# Patient Record
Sex: MALE | Race: WHITE | NOT HISPANIC OR LATINO | URBAN - METROPOLITAN AREA
[De-identification: names, ages, dates, MRNs, and addresses within clinical notes are randomized per-mention and may not be internally consistent; named-entity substitution may affect disease eponyms.]

---

## 2018-05-24 ENCOUNTER — OFFICE (AMBULATORY)
Dept: URBAN - METROPOLITAN AREA PATHOLOGY 4 | Facility: PATHOLOGY | Age: 68
End: 2018-05-24
Payer: COMMERCIAL

## 2018-05-24 ENCOUNTER — ON CAMPUS - OUTPATIENT (AMBULATORY)
Dept: URBAN - METROPOLITAN AREA HOSPITAL 2 | Facility: HOSPITAL | Age: 68
End: 2018-05-24
Payer: COMMERCIAL

## 2018-05-24 ENCOUNTER — HOSPITAL ENCOUNTER (OUTPATIENT)
Dept: OTHER | Facility: HOSPITAL | Age: 68
Setting detail: SPECIMEN
Discharge: HOME | End: 2018-05-24
Attending: INTERNAL MEDICINE | Admitting: INTERNAL MEDICINE

## 2018-05-24 VITALS
SYSTOLIC BLOOD PRESSURE: 162 MMHG | TEMPERATURE: 97.9 F | HEART RATE: 76 BPM | RESPIRATION RATE: 13 BRPM | DIASTOLIC BLOOD PRESSURE: 73 MMHG | HEART RATE: 77 BPM | RESPIRATION RATE: 16 BRPM | OXYGEN SATURATION: 98 % | SYSTOLIC BLOOD PRESSURE: 134 MMHG | HEART RATE: 86 BPM | DIASTOLIC BLOOD PRESSURE: 93 MMHG | HEART RATE: 71 BPM | HEIGHT: 71 IN | SYSTOLIC BLOOD PRESSURE: 119 MMHG | SYSTOLIC BLOOD PRESSURE: 184 MMHG | DIASTOLIC BLOOD PRESSURE: 63 MMHG | SYSTOLIC BLOOD PRESSURE: 165 MMHG | SYSTOLIC BLOOD PRESSURE: 112 MMHG | WEIGHT: 167 LBS | HEART RATE: 78 BPM | DIASTOLIC BLOOD PRESSURE: 66 MMHG | DIASTOLIC BLOOD PRESSURE: 75 MMHG | DIASTOLIC BLOOD PRESSURE: 70 MMHG | DIASTOLIC BLOOD PRESSURE: 102 MMHG | DIASTOLIC BLOOD PRESSURE: 97 MMHG | OXYGEN SATURATION: 97 % | HEART RATE: 75 BPM | RESPIRATION RATE: 10 BRPM | SYSTOLIC BLOOD PRESSURE: 120 MMHG | SYSTOLIC BLOOD PRESSURE: 106 MMHG | OXYGEN SATURATION: 99 % | DIASTOLIC BLOOD PRESSURE: 110 MMHG | RESPIRATION RATE: 12 BRPM | SYSTOLIC BLOOD PRESSURE: 129 MMHG | RESPIRATION RATE: 14 BRPM | DIASTOLIC BLOOD PRESSURE: 67 MMHG | HEART RATE: 72 BPM | OXYGEN SATURATION: 100 %

## 2018-05-24 DIAGNOSIS — Z86.010 PERSONAL HISTORY OF COLONIC POLYPS: ICD-10-CM

## 2018-05-24 DIAGNOSIS — D12.2 BENIGN NEOPLASM OF ASCENDING COLON: ICD-10-CM

## 2018-05-24 DIAGNOSIS — K57.30 DIVERTICULOSIS OF LARGE INTESTINE WITHOUT PERFORATION OR ABS: ICD-10-CM

## 2018-05-24 DIAGNOSIS — K64.1 SECOND DEGREE HEMORRHOIDS: ICD-10-CM

## 2018-05-24 LAB
GI HISTOLOGY: A. UNSPECIFIED: (no result)
GI HISTOLOGY: PDF REPORT: (no result)

## 2018-05-24 PROCEDURE — 88305 TISSUE EXAM BY PATHOLOGIST: CPT | Mod: 26 | Performed by: INTERNAL MEDICINE

## 2018-05-24 PROCEDURE — 45380 COLONOSCOPY AND BIOPSY: CPT | Mod: PT | Performed by: INTERNAL MEDICINE

## 2018-05-24 RX ADMIN — PROPOFOL: 10 INJECTION, EMULSION INTRAVENOUS at 11:06

## 2021-07-21 ENCOUNTER — OFFICE VISIT (OUTPATIENT)
Dept: CARDIOLOGY | Facility: CLINIC | Age: 71
End: 2021-07-21

## 2021-07-21 VITALS
OXYGEN SATURATION: 99 % | HEART RATE: 84 BPM | DIASTOLIC BLOOD PRESSURE: 93 MMHG | WEIGHT: 270 LBS | BODY MASS INDEX: 37.8 KG/M2 | HEIGHT: 71 IN | SYSTOLIC BLOOD PRESSURE: 170 MMHG

## 2021-07-21 DIAGNOSIS — R06.02 SHORTNESS OF BREATH: Primary | ICD-10-CM

## 2021-07-21 DIAGNOSIS — E08.9 DIABETES MELLITUS DUE TO UNDERLYING CONDITION WITHOUT COMPLICATION, WITHOUT LONG-TERM CURRENT USE OF INSULIN (HCC): ICD-10-CM

## 2021-07-21 DIAGNOSIS — I10 ESSENTIAL HYPERTENSION: ICD-10-CM

## 2021-07-21 DIAGNOSIS — E78.5 DYSLIPIDEMIA: ICD-10-CM

## 2021-07-21 PROCEDURE — 93000 ELECTROCARDIOGRAM COMPLETE: CPT | Performed by: INTERNAL MEDICINE

## 2021-07-21 PROCEDURE — 99204 OFFICE O/P NEW MOD 45 MIN: CPT | Performed by: INTERNAL MEDICINE

## 2021-07-21 RX ORDER — ATORVASTATIN CALCIUM 10 MG/1
10 TABLET, FILM COATED ORAL DAILY
COMMUNITY
Start: 2021-07-13

## 2021-07-21 RX ORDER — DOCUSATE SODIUM 100 MG/1
100 CAPSULE, LIQUID FILLED ORAL DAILY
COMMUNITY
Start: 2021-05-03

## 2021-07-21 RX ORDER — ACETAMINOPHEN 500 MG
500 TABLET ORAL EVERY 6 HOURS PRN
COMMUNITY

## 2021-07-21 RX ORDER — ASPIRIN 81 MG/1
81 TABLET ORAL DAILY
COMMUNITY

## 2021-07-21 RX ORDER — DICLOFENAC SODIUM 75 MG/1
75 TABLET, DELAYED RELEASE ORAL 2 TIMES DAILY
COMMUNITY

## 2021-07-21 RX ORDER — TRAMADOL HYDROCHLORIDE 50 MG/1
50 TABLET ORAL 2 TIMES DAILY
COMMUNITY
Start: 2021-05-23

## 2021-07-21 RX ORDER — HYDROCHLOROTHIAZIDE 25 MG/1
25 TABLET ORAL DAILY
COMMUNITY
Start: 2021-05-23 | End: 2021-08-09 | Stop reason: ALTCHOICE

## 2021-07-21 RX ORDER — TRIAMTERENE AND HYDROCHLOROTHIAZIDE 37.5; 25 MG/1; MG/1
1 TABLET ORAL DAILY
COMMUNITY

## 2021-07-21 RX ORDER — TOPIRAMATE 25 MG/1
25 TABLET ORAL 2 TIMES DAILY
COMMUNITY
Start: 2021-06-16 | End: 2021-08-09 | Stop reason: ALTCHOICE

## 2021-07-21 NOTE — PROGRESS NOTES
Encounter Date:07/21/2021      Patient ID: Juan Alberto Thorpe is a 70 y.o. male.    Chief Complaint:  Increased shortness of breath  Hypertension  Dyslipidemia  Diabetes    History of Present Illness  The patient is a pleasant 70-year-old white male is here for evaluation of progressively worsening shortness of breath over last several months.  According to his wife patient cannot walk distance without getting short of breath.  Shortness of breath gets better with resting.  Denies having any chest discomfort heaviness or tightness in the chest.  No other associated aggravating or elevating factors.  No symptoms of orthopnea or PND.  Patient did not have any cardiac problems in the past.  Patient is a non-smoker.  Patient has multiple coronary risk factors as documented below.    Patient is not having any chest discomfort palpitations, dizziness or syncope.  Denies having any headache ,abdominal pain ,nausea, vomiting , diarrhea constipation, loss of weight or loss of appetite.  Denies having any excessive bruising ,hematuria or blood in the stool.    Review of all systems negative except as indicated.    Reviewed ROS.    Assessment and Plan     ]]]]]]]]]]]]]]]]]]]  Impression  ==========  -Shortness of breath with exertion-angina equivalent.  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  Patient has multiple underlying coronary risk factors.  Lexiscan Cardiolite test.  Echocardiogram  Follow-up in the office on the same day.  Further plan will depend on patient's progress.  ]]]]]]]]]]]]]]]           Diagnosis Plan   1. Shortness  of breath     2. Essential hypertension     3. Dyslipidemia     4. Diabetes mellitus due to underlying condition without complication, without long-term current use of insulin (CMS/[facility])     LAB RESULTS (LAST 7 DAYS)    CBC        BMP        CMP         BNP        TROPONIN        CoAg        Creatinine Clearance  CrCl cannot be calculated (No successful lab value found.).    ABG        Radiology  No radiology results for the last day                The following portions of the patient's history were reviewed and updated as appropriate: allergies, current medications, past family history, past medical history, past social history, past surgical history and problem list.    Review of Systems   Constitutional: Negative for malaise/fatigue.   Cardiovascular: Positive for leg swelling and palpitations (skipped beats). Negative for chest pain and syncope.   Respiratory: Positive for shortness of breath.    Skin: Negative for rash.   Gastrointestinal: Negative for nausea and vomiting.   Neurological: Positive for tremors. Negative for dizziness, light-headedness and numbness.         Current Outpatient Medications:   •  acetaminophen (TYLENOL) 500 MG tablet, Take 500 mg by mouth Every 6 (Six) Hours As Needed for Mild Pain ., Disp: , Rfl:   •  aspirin 81 MG EC tablet, Take 81 mg by mouth Daily., Disp: , Rfl:   •  atorvastatin (LIPITOR) 10 MG tablet, Take 10 mg by mouth Daily., Disp: , Rfl:   •  diclofenac (VOLTAREN) 75 MG EC tablet, Take 75 mg by mouth 2 (Two) Times a Day., Disp: , Rfl:   •  docusate sodium (COLACE) 100 MG capsule, Take 100 mg by mouth Daily., Disp: , Rfl:   •  topiramate (TOPAMAX) 25 MG tablet, Take 25 mg by mouth 2 (Two) Times a Day., Disp: , Rfl:   •  traMADol (ULTRAM) 50 MG tablet, Take 50 mg by mouth 2 (Two) Times a Day., Disp: , Rfl:   •  triamterene-hydrochlorothiazide (MAXZIDE-25) 37.5-25 MG per tablet, Take 1 tablet by mouth Daily., Disp: , Rfl:   •  hydroCHLOROthiazide (HYDRODIURIL) 25 MG tablet,  "Take 25 mg by mouth Daily., Disp: , Rfl:   •  metFORMIN (GLUCOPHAGE) 1000 MG tablet, Take 1,000 mg by mouth 2 (Two) Times a Day., Disp: , Rfl:     Allergies   Allergen Reactions   • Sulfa Antibiotics Other (See Comments)     Unknown        Family History   Problem Relation Age of Onset   • Anuerysm Mother    • Lung cancer Father        Past Surgical History:   Procedure Laterality Date   • CARDIAC CATHETERIZATION     • CYSTOSCOPY BLADDER STONE LITHOTRIPSY     • REPLACEMENT TOTAL KNEE         Past Medical History:   Diagnosis Date   • Arthritis    • Diabetes mellitus (CMS/HCC)    • Hyperlipidemia    • Hypertension        Family History   Problem Relation Age of Onset   • Anuerysm Mother    • Lung cancer Father        Social History     Socioeconomic History   • Marital status:      Spouse name: Not on file   • Number of children: Not on file   • Years of education: Not on file   • Highest education level: Not on file   Tobacco Use   • Smoking status: Never Smoker   • Smokeless tobacco: Former User   Vaping Use   • Vaping Use: Never used   Substance and Sexual Activity   • Alcohol use: Not Currently     Comment: Beer. 1-2 times per year    • Drug use: Never   • Sexual activity: Defer           ECG 12 Lead    Date/Time: 7/21/2021 3:52 PM  Performed by: Kiara Prakash MD  Authorized by: Kiara Prakash MD   Comparison: compared with previous ECG   Comparison to previous ECG: Normal sinus rhythm nonspecific ST-T wave changes 82/min normal axis normal intervals no ectopy small inferolateral Q waves normal axis.                Objective:       Physical Exam    /93   Pulse 84   Ht 180.3 cm (71\")   Wt 122 kg (270 lb)   SpO2 99%   BMI 37.66 kg/m²   The patient is alert, oriented and in no distress.    Vital signs as noted above.  Exogenous obesity (BMI 38)    Head and neck revealed no carotid bruits or jugular venous distension.  No thyromegaly or lymphadenopathy is present.    Lungs clear.  No " wheezing.  Breath sounds are normal bilaterally.    Heart normal first and second heart sounds.  No murmur..  No pericardial rub is present.  No gallop is present.    Abdomen soft and nontender.  No organomegaly is present.    Extremities revealed good peripheral pulses.  1+ edema    Skin warm and dry.    Musculoskeletal system is grossly normal.    CNS grossly normal.

## 2021-08-09 ENCOUNTER — HOSPITAL ENCOUNTER (OUTPATIENT)
Dept: CARDIOLOGY | Facility: HOSPITAL | Age: 71
Discharge: HOME | End: 2021-08-09

## 2021-08-09 ENCOUNTER — OFFICE VISIT (OUTPATIENT)
Dept: CARDIOLOGY | Facility: CLINIC | Age: 71
End: 2021-08-09

## 2021-08-09 VITALS
HEIGHT: 71 IN | DIASTOLIC BLOOD PRESSURE: 92 MMHG | BODY MASS INDEX: 37.8 KG/M2 | WEIGHT: 270 LBS | SYSTOLIC BLOOD PRESSURE: 152 MMHG

## 2021-08-09 VITALS
HEIGHT: 71 IN | DIASTOLIC BLOOD PRESSURE: 86 MMHG | HEART RATE: 81 BPM | BODY MASS INDEX: 37.8 KG/M2 | OXYGEN SATURATION: 96 % | WEIGHT: 270 LBS | SYSTOLIC BLOOD PRESSURE: 142 MMHG

## 2021-08-09 DIAGNOSIS — E78.5 DYSLIPIDEMIA: ICD-10-CM

## 2021-08-09 DIAGNOSIS — I10 ESSENTIAL HYPERTENSION: ICD-10-CM

## 2021-08-09 DIAGNOSIS — E08.9 DIABETES MELLITUS DUE TO UNDERLYING CONDITION WITHOUT COMPLICATION, WITHOUT LONG-TERM CURRENT USE OF INSULIN (HCC): ICD-10-CM

## 2021-08-09 DIAGNOSIS — R06.02 SHORTNESS OF BREATH: ICD-10-CM

## 2021-08-09 DIAGNOSIS — R06.02 SHORTNESS OF BREATH: Primary | ICD-10-CM

## 2021-08-09 LAB
BH CV ECHO MEAS - ACS: 2 CM
BH CV ECHO MEAS - AO MAX PG (FULL): 0.2 MMHG
BH CV ECHO MEAS - AO MAX PG: 4.9 MMHG
BH CV ECHO MEAS - AO MEAN PG (FULL): 0.37 MMHG
BH CV ECHO MEAS - AO MEAN PG: 3 MMHG
BH CV ECHO MEAS - AO ROOT AREA (BSA CORRECTED): 1.5
BH CV ECHO MEAS - AO ROOT AREA: 10.6 CM^2
BH CV ECHO MEAS - AO ROOT DIAM: 3.7 CM
BH CV ECHO MEAS - AO V2 MAX: 111.1 CM/SEC
BH CV ECHO MEAS - AO V2 MEAN: 83.7 CM/SEC
BH CV ECHO MEAS - AO V2 VTI: 20.6 CM
BH CV ECHO MEAS - ASC AORTA: 3 CM
BH CV ECHO MEAS - AVA(I,A): 3.8 CM^2
BH CV ECHO MEAS - AVA(I,D): 3.8 CM^2
BH CV ECHO MEAS - AVA(V,A): 3.3 CM^2
BH CV ECHO MEAS - AVA(V,D): 3.3 CM^2
BH CV ECHO MEAS - BSA(HAYCOCK): 2.5 M^2
BH CV ECHO MEAS - BSA: 2.4 M^2
BH CV ECHO MEAS - BZI_BMI: 37.7 KILOGRAMS/M^2
BH CV ECHO MEAS - BZI_METRIC_HEIGHT: 180.3 CM
BH CV ECHO MEAS - BZI_METRIC_WEIGHT: 122.5 KG
BH CV ECHO MEAS - EDV(CUBED): 88.5 ML
BH CV ECHO MEAS - EDV(MOD-SP4): 49.7 ML
BH CV ECHO MEAS - EDV(TEICH): 90.3 ML
BH CV ECHO MEAS - EF(CUBED): 90.5 %
BH CV ECHO MEAS - EF(MOD-SP4): 54.9 %
BH CV ECHO MEAS - EF(TEICH): 85.3 %
BH CV ECHO MEAS - ESV(CUBED): 8.4 ML
BH CV ECHO MEAS - ESV(MOD-SP4): 22.4 ML
BH CV ECHO MEAS - ESV(TEICH): 13.3 ML
BH CV ECHO MEAS - FS: 54.4 %
BH CV ECHO MEAS - IVS/LVPW: 1.2
BH CV ECHO MEAS - IVSD: 1.4 CM
BH CV ECHO MEAS - LA DIMENSION: 4.7 CM
BH CV ECHO MEAS - LA/AO: 1.3
BH CV ECHO MEAS - LV DIASTOLIC VOL/BSA (35-75): 20.7 ML/M^2
BH CV ECHO MEAS - LV MASS(C)D: 216.4 GRAMS
BH CV ECHO MEAS - LV MASS(C)DI: 90.3 GRAMS/M^2
BH CV ECHO MEAS - LV MAX PG: 4.7 MMHG
BH CV ECHO MEAS - LV MEAN PG: 2.6 MMHG
BH CV ECHO MEAS - LV SYSTOLIC VOL/BSA (12-30): 9.4 ML/M^2
BH CV ECHO MEAS - LV V1 MAX: 108.9 CM/SEC
BH CV ECHO MEAS - LV V1 MEAN: 77 CM/SEC
BH CV ECHO MEAS - LV V1 VTI: 23.7 CM
BH CV ECHO MEAS - LVIDD: 4.5 CM
BH CV ECHO MEAS - LVIDS: 2 CM
BH CV ECHO MEAS - LVOT AREA: 3.3 CM^2
BH CV ECHO MEAS - LVOT DIAM: 2.1 CM
BH CV ECHO MEAS - LVPWD: 1.1 CM
BH CV ECHO MEAS - MV A MAX VEL: 88.2 CM/SEC
BH CV ECHO MEAS - MV DEC SLOPE: 251.7 CM/SEC^2
BH CV ECHO MEAS - MV DEC TIME: 0.2 SEC
BH CV ECHO MEAS - MV E MAX VEL: 50.1 CM/SEC
BH CV ECHO MEAS - MV E/A: 0.57
BH CV ECHO MEAS - MV MAX PG: 4 MMHG
BH CV ECHO MEAS - MV MEAN PG: 1.4 MMHG
BH CV ECHO MEAS - MV V2 MAX: 100.2 CM/SEC
BH CV ECHO MEAS - MV V2 MEAN: 56.6 CM/SEC
BH CV ECHO MEAS - MV V2 VTI: 14.7 CM
BH CV ECHO MEAS - MVA(VTI): 5.4 CM^2
BH CV ECHO MEAS - PA ACC TIME: 0.1 SEC
BH CV ECHO MEAS - PA PR(ACCEL): 33.8 MMHG
BH CV ECHO MEAS - RV MAX PG: 2.2 MMHG
BH CV ECHO MEAS - RV MEAN PG: 1.2 MMHG
BH CV ECHO MEAS - RV V1 MAX: 74.8 CM/SEC
BH CV ECHO MEAS - RV V1 MEAN: 53 CM/SEC
BH CV ECHO MEAS - RV V1 VTI: 13.6 CM
BH CV ECHO MEAS - RVDD: 3 CM
BH CV ECHO MEAS - SI(AO): 91.4 ML/M^2
BH CV ECHO MEAS - SI(CUBED): 33.4 ML/M^2
BH CV ECHO MEAS - SI(LVOT): 33 ML/M^2
BH CV ECHO MEAS - SI(MOD-SP4): 11.4 ML/M^2
BH CV ECHO MEAS - SI(TEICH): 32.2 ML/M^2
BH CV ECHO MEAS - SV(AO): 218.9 ML
BH CV ECHO MEAS - SV(CUBED): 80.1 ML
BH CV ECHO MEAS - SV(LVOT): 79 ML
BH CV ECHO MEAS - SV(MOD-SP4): 27.3 ML
BH CV ECHO MEAS - SV(TEICH): 77.1 ML
BH CV REST NUCLEAR ISOTOPE DOSE: 10.3 MCI
BH CV STRESS COMMENTS STAGE 1: NORMAL
BH CV STRESS DOSE REGADENOSON STAGE 1: 0.4
BH CV STRESS DURATION MIN STAGE 1: 0
BH CV STRESS DURATION SEC STAGE 1: 10
BH CV STRESS NUCLEAR ISOTOPE DOSE: 31.4 MCI
BH CV STRESS PROTOCOL 1: NORMAL
BH CV STRESS RECOVERY BP: NORMAL MMHG
BH CV STRESS RECOVERY HR: 105 BPM
BH CV STRESS STAGE 1: 1
LV EF 2D ECHO EST: 60 %
MAXIMAL PREDICTED HEART RATE: 149 BPM
MAXIMAL PREDICTED HEART RATE: 149 BPM
STRESS BASELINE BP: NORMAL MMHG
STRESS BASELINE HR: 82 BPM
STRESS TARGET HR: 127 BPM
STRESS TARGET HR: 127 BPM

## 2021-08-09 PROCEDURE — 93306 TTE W/DOPPLER COMPLETE: CPT | Performed by: INTERNAL MEDICINE

## 2021-08-09 PROCEDURE — 93306 TTE W/DOPPLER COMPLETE: CPT

## 2021-08-09 PROCEDURE — 93017 CV STRESS TEST TRACING ONLY: CPT

## 2021-08-09 PROCEDURE — A9500 TC99M SESTAMIBI: HCPCS | Performed by: INTERNAL MEDICINE

## 2021-08-09 PROCEDURE — 99214 OFFICE O/P EST MOD 30 MIN: CPT | Performed by: INTERNAL MEDICINE

## 2021-08-09 PROCEDURE — 93018 CV STRESS TEST I&R ONLY: CPT | Performed by: INTERNAL MEDICINE

## 2021-08-09 PROCEDURE — 93016 CV STRESS TEST SUPVJ ONLY: CPT | Performed by: INTERNAL MEDICINE

## 2021-08-09 PROCEDURE — 78452 HT MUSCLE IMAGE SPECT MULT: CPT

## 2021-08-09 PROCEDURE — 78452 HT MUSCLE IMAGE SPECT MULT: CPT | Performed by: INTERNAL MEDICINE

## 2021-08-09 PROCEDURE — 0 TECHNETIUM SESTAMIBI: Performed by: INTERNAL MEDICINE

## 2021-08-09 PROCEDURE — 25010000002 REGADENOSON 0.4 MG/5ML SOLUTION: Performed by: INTERNAL MEDICINE

## 2021-08-09 RX ORDER — SITAGLIPTIN AND METFORMIN HYDROCHLORIDE 1000; 50 MG/1; MG/1
1 TABLET, FILM COATED ORAL 2 TIMES DAILY
COMMUNITY
Start: 2021-07-21

## 2021-08-09 RX ADMIN — REGADENOSON 0.4 MG: 0.08 INJECTION, SOLUTION INTRAVENOUS at 13:44

## 2021-08-09 RX ADMIN — TECHNETIUM TC 99M SESTAMIBI 1 DOSE: 1 INJECTION INTRAVENOUS at 13:44

## 2021-08-09 RX ADMIN — TECHNETIUM TC 99M SESTAMIBI 1 DOSE: 1 INJECTION INTRAVENOUS at 12:13

## 2021-08-09 NOTE — PROGRESS NOTES
Encounter Date:08/09/2021  Last seen 7/21/2021      Patient ID: Juan Alberto Thorpe is a 70 y.o. male.    Chief Complaint:  Increased shortness of breath  Hypertension  Dyslipidemia  Diabetes     History of Present Illness  Patient recently was seen with following history.  Reviewed and updated.    The patient is a pleasant 70-year-old white male is here for evaluation of progressively worsening shortness of breath over last several months.  According to his wife patient cannot walk distance without getting short of breath.  Shortness of breath gets better with resting.  Denies having any chest discomfort heaviness or tightness in the chest.  No other associated aggravating or elevating factors.  No symptoms of orthopnea or PND.  Patient did not have any cardiac problems in the past.  Patient is a non-smoker.  Patient has multiple coronary risk factors as documented below.     Patient is not having any chest discomfort palpitations, dizziness or syncope.  Denies having any headache ,abdominal pain ,nausea, vomiting , diarrhea constipation, loss of weight or loss of appetite.  Denies having any excessive bruising ,hematuria or blood in the stool.     Review of all systems negative except as indicated.     Reviewed ROS.     Assessment and Plan      ]]]]]]]]]]]]]]]]]]]  Impression  ==========  -Shortness of breath with exertion-angina equivalent.  Lower extremity edema    Echocardiogram-normal except for mild left atrial enlargement-8/9/2021.  Lexiscan Cardiolite test-8/9/2021-normal    -Dyslipidemia hypertension diabetes     -Status post right total knee replacement     -Exogenous obesity (BMI 38)     -Family history of lung cancer     -Non-smoker     -Allergic to sulfa  =========  Plan  ==========  Recent EKG showed sinus rhythm nonspecific ST-T wave changes  Patient has significantly and progressively worsening shortness of breath with exertion and relieved by rest suggestive of possible angina equivalent.  Patient has  multiple underlying coronary risk factors.  Lexiscan Cardiolite test.-Normal as above  Echocardiogram-as above  Follow-up in the office 6 weeks.  No need for cardiac catheterization at this time.  Consider cardiac catheterization when patient returns to the office if he has continued symptoms.  Patient and patient's wife were educated regarding the results of the testing and discussed the options.  Further plan will depend on patient's progress.  ]]]]]]]]]]]]]]]               Diagnosis Plan   1. Shortness of breath     2. Essential hypertension     3. Dyslipidemia     4. Diabetes mellitus due to underlying condition without complication, without long-term current use of insulin (CMS/[facility])     LAB RESULTS (LAST 7 DAYS)    CBC        BMP        CMP         BNP        TROPONIN        CoAg        Creatinine Clearance  CrCl cannot be calculated (No successful lab value found.).    ABG        Radiology  No radiology results for the last day                The following portions of the patient's history were reviewed and updated as appropriate: allergies, current medications, past family history, past medical history, past social history, past surgical history and problem list.    Review of Systems   Constitutional: Negative for chills, fever and malaise/fatigue.   Cardiovascular: Positive for dyspnea on exertion and leg swelling. Negative for chest pain, palpitations and syncope.   Respiratory: Positive for shortness of breath.    Skin: Negative for rash.   Neurological: Negative for dizziness, light-headedness and numbness.         Current Outpatient Medications:   •  acetaminophen (TYLENOL) 500 MG tablet, Take 500 mg by mouth Every 6 (Six) Hours As Needed for Mild Pain ., Disp: , Rfl:   •  aspirin 81 MG EC tablet, Take 81 mg by mouth Daily., Disp: , Rfl:   •  atorvastatin (LIPITOR) 10 MG tablet, Take 10 mg by mouth Daily., Disp: , Rfl:   •  diclofenac (VOLTAREN) 75 MG EC tablet, Take 75 mg by mouth 2 (Two) Times a Day.,  "Disp: , Rfl:   •  docusate sodium (COLACE) 100 MG capsule, Take 100 mg by mouth Daily., Disp: , Rfl:   •  Janumet  MG per tablet, Take 1 tablet by mouth 2 (Two) Times a Day., Disp: , Rfl:   •  traMADol (ULTRAM) 50 MG tablet, Take 50 mg by mouth 2 (Two) Times a Day., Disp: , Rfl:   •  triamterene-hydrochlorothiazide (MAXZIDE-25) 37.5-25 MG per tablet, Take 1 tablet by mouth Daily., Disp: , Rfl:   No current facility-administered medications for this visit.    Allergies   Allergen Reactions   • Sulfa Antibiotics Other (See Comments)     Unknown        Family History   Problem Relation Age of Onset   • Anuerysm Mother    • Lung cancer Father        Past Surgical History:   Procedure Laterality Date   • CARDIAC CATHETERIZATION     • CYSTOSCOPY BLADDER STONE LITHOTRIPSY     • REPLACEMENT TOTAL KNEE         Past Medical History:   Diagnosis Date   • Arthritis    • Diabetes mellitus (CMS/HCC)    • Hyperlipidemia    • Hypertension        Family History   Problem Relation Age of Onset   • Anuerysm Mother    • Lung cancer Father        Social History     Socioeconomic History   • Marital status:      Spouse name: Not on file   • Number of children: Not on file   • Years of education: Not on file   • Highest education level: Not on file   Tobacco Use   • Smoking status: Never Smoker   • Smokeless tobacco: Former User   Vaping Use   • Vaping Use: Never used   Substance and Sexual Activity   • Alcohol use: Not Currently     Comment: Beer. 1-2 times per year    • Drug use: Never   • Sexual activity: Defer         Procedures      Objective:       Physical Exam    /86 (BP Location: Left arm, Patient Position: Sitting, Cuff Size: Large Adult)   Pulse 81   Ht 180.3 cm (71\")   Wt 122 kg (270 lb)   SpO2 96%   BMI 37.66 kg/m²   The patient is alert, oriented and in no distress.    Vital signs as noted above.  Exogenous obesity (BMI 38)    Head and neck revealed no carotid bruits or jugular venous distension.  No " thyromegaly or lymphadenopathy is present.    Lungs clear.  No wheezing.  Breath sounds are normal bilaterally.    Heart normal first and second heart sounds.  No murmur..  No pericardial rub is present.  No gallop is present.    Abdomen soft and nontender.  No organomegaly is present.    Extremities revealed good peripheral pulses without any pedal edema.    Skin warm and dry.    Musculoskeletal system is grossly normal.    CNS grossly normal.

## 2021-09-27 ENCOUNTER — OFFICE VISIT (OUTPATIENT)
Dept: CARDIOLOGY | Facility: CLINIC | Age: 71
End: 2021-09-27

## 2021-09-27 VITALS
DIASTOLIC BLOOD PRESSURE: 86 MMHG | SYSTOLIC BLOOD PRESSURE: 151 MMHG | HEART RATE: 84 BPM | HEIGHT: 71 IN | OXYGEN SATURATION: 99 % | BODY MASS INDEX: 36.82 KG/M2 | WEIGHT: 263 LBS

## 2021-09-27 DIAGNOSIS — E08.9 DIABETES MELLITUS DUE TO UNDERLYING CONDITION WITHOUT COMPLICATION, WITHOUT LONG-TERM CURRENT USE OF INSULIN (HCC): ICD-10-CM

## 2021-09-27 DIAGNOSIS — I10 ESSENTIAL HYPERTENSION: ICD-10-CM

## 2021-09-27 DIAGNOSIS — E78.5 DYSLIPIDEMIA: ICD-10-CM

## 2021-09-27 DIAGNOSIS — R06.02 SHORTNESS OF BREATH: Primary | ICD-10-CM

## 2021-09-27 PROCEDURE — 99214 OFFICE O/P EST MOD 30 MIN: CPT | Performed by: INTERNAL MEDICINE

## 2021-09-27 NOTE — PROGRESS NOTES
Encounter Date:09/27/2021  Last seen 1/18/2021      Patient ID: Juan Alberto Thorpe is a 70 y.o. male.    Chief Complaint:  Increased shortness of breath  Hypertension  Dyslipidemia  Diabetes     History of Present Illness  Patient recently was seen with following history.  Reviewed and updated.  Patient is doing better     The patient is a pleasant 70-year-old white male is here for evaluation of progressively worsening shortness of breath over last several months.  According to his wife patient cannot walk distance without getting short of breath.  Shortness of breath gets better with resting.  Denies having any chest discomfort heaviness or tightness in the chest.  No other associated aggravating or elevating factors.  No symptoms of orthopnea or PND.  Patient did not have any cardiac problems in the past.  Patient is a non-smoker.  Patient has multiple coronary risk factors as documented below.     Patient is not having any chest discomfort palpitations, dizziness or syncope.  Denies having any headache ,abdominal pain ,nausea, vomiting , diarrhea constipation, loss of weight or loss of appetite.  Denies having any excessive bruising ,hematuria or blood in the stool.     Review of all systems negative except as indicated.     Reviewed ROS.     Assessment and Plan      ]]]]]]]]]]]]]]]]]]]  Impression  ==========  -Shortness of breath with exertion-angina equivalent.-Improved    Lower extremity edema-improved     Echocardiogram-normal except for mild left atrial enlargement-8/9/2021.  Lexiscan Cardiolite test-8/9/2021-normal     -Dyslipidemia hypertension diabetes     -Status post right total knee replacement     -Exogenous obesity (BMI 38)     -Family history of lung cancer     -Non-smoker     -Allergic to sulfa  =========  Plan  ==========  Recent EKG showed sinus rhythm nonspecific ST-T wave changes  Patient has significantly and progressively worsening shortness of breath with exertion and relieved by rest  suggestive of possible angina equivalent.  Patient has multiple underlying coronary risk factors.  Lexiscan Cardiolite test.-Normal as above  Echocardiogram-as above    Patient has improved shortness of breath and lower extremity edema.  No need for cardiac catheterization at this time.  Follow-up in the office in 6 months.  Consider cardiac catheterization when patient returns to the office if he has continued symptoms.    Further plan will depend on patient's progress.  ]]]]]]]]]]]]]]]                   Diagnosis Plan   1. Shortness of breath     2. Essential hypertension     3. Dyslipidemia     4. Diabetes mellitus due to underlying condition without complication, without long-term current use of insulin (HCC)     LAB RESULTS (LAST 7 DAYS)    CBC        BMP        CMP         BNP        TROPONIN        CoAg        Creatinine Clearance  CrCl cannot be calculated (No successful lab value found.).    ABG        Radiology  No radiology results for the last day                The following portions of the patient's history were reviewed and updated as appropriate: allergies, current medications, past family history, past medical history, past social history, past surgical history and problem list.    Review of Systems   Constitutional: Negative for malaise/fatigue.   Cardiovascular: Negative for chest pain, leg swelling, palpitations and syncope.   Respiratory: Negative for shortness of breath.    Skin: Negative for rash.   Gastrointestinal: Negative for nausea and vomiting.   Neurological: Negative for dizziness, light-headedness and numbness.   All other systems reviewed and are negative.        Current Outpatient Medications:   •  acetaminophen (TYLENOL) 500 MG tablet, Take 500 mg by mouth Every 6 (Six) Hours As Needed for Mild Pain ., Disp: , Rfl:   •  aspirin 81 MG EC tablet, Take 81 mg by mouth Daily., Disp: , Rfl:   •  atorvastatin (LIPITOR) 10 MG tablet, Take 10 mg by mouth Daily., Disp: , Rfl:   •  diclofenac  "(VOLTAREN) 75 MG EC tablet, Take 75 mg by mouth 2 (Two) Times a Day., Disp: , Rfl:   •  docusate sodium (COLACE) 100 MG capsule, Take 100 mg by mouth Daily., Disp: , Rfl:   •  Janumet  MG per tablet, Take 1 tablet by mouth 2 (Two) Times a Day., Disp: , Rfl:   •  traMADol (ULTRAM) 50 MG tablet, Take 50 mg by mouth 2 (Two) Times a Day., Disp: , Rfl:   •  triamterene-hydrochlorothiazide (MAXZIDE-25) 37.5-25 MG per tablet, Take 1 tablet by mouth Daily., Disp: , Rfl:     Allergies   Allergen Reactions   • Sulfa Antibiotics Other (See Comments)     Unknown        Family History   Problem Relation Age of Onset   • Anuerysm Mother    • Lung cancer Father        Past Surgical History:   Procedure Laterality Date   • CARDIAC CATHETERIZATION     • CYSTOSCOPY BLADDER STONE LITHOTRIPSY     • REPLACEMENT TOTAL KNEE         Past Medical History:   Diagnosis Date   • Arthritis    • Diabetes mellitus (CMS/HCC)    • Hyperlipidemia    • Hypertension        Family History   Problem Relation Age of Onset   • Anuerysm Mother    • Lung cancer Father        Social History     Socioeconomic History   • Marital status:      Spouse name: Not on file   • Number of children: Not on file   • Years of education: Not on file   • Highest education level: Not on file   Tobacco Use   • Smoking status: Never Smoker   • Smokeless tobacco: Former User   Vaping Use   • Vaping Use: Never used   Substance and Sexual Activity   • Alcohol use: Not Currently     Comment: Beer. 1-2 times per year    • Drug use: Never   • Sexual activity: Defer         Procedures      Objective:       Physical Exam    /86 (BP Location: Left arm, Patient Position: Sitting, Cuff Size: Large Adult)   Pulse 84   Ht 180.3 cm (71\")   Wt 119 kg (263 lb)   SpO2 99%   BMI 36.68 kg/m²   The patient is alert, oriented and in no distress.    Vital signs as noted above.    Head and neck revealed no carotid bruits or jugular venous distension.  No thyromegaly or " lymphadenopathy is present.    Lungs clear.  No wheezing.  Breath sounds are normal bilaterally.    Heart normal first and second heart sounds.  No murmur..  No pericardial rub is present.  No gallop is present.    Abdomen soft and nontender.  No organomegaly is present.    Extremities revealed good peripheral pulses without any pedal edema.    Skin warm and dry.    Musculoskeletal system is grossly normal.    CNS grossly normal.    Reviewed and unchanged from last visit.

## 2021-11-11 NOTE — PROGRESS NOTES
Subjective: tremors    Patient ID: Juan Alberto Thorpe is a 71 y.o. male.    CHIEF COMPLAINT:tremors    History of Present Illness Mr. Thorpe is a 71-year-old  male with a BMI of 36.54 who presented today with his wife for evaluation of tremor referred by   Lexi CAMPUZANO for tremors.  He reports the tremors are mostly action but sometimes he has some resting.  He states he has them in the right arm and right leg and sometimes in the left arm.  These tremors started 3 to 4 months ago gotten progressively worse.    His wife states he snores loudly and has irregular breathing at night.  He states he is very sleepy and can never sleep flat in the bed due to trouble breathing.  He is also overweight and is hypertensive as well as diabetic.  He was notified that sleep apnea increases the risk for stroke, hypertension, diabetes, and heart attack.      Complaint: Tremors  Onset:  3-4 months ago  Location:right/arm right legs ocassionsl left arm   Quality: resting  Severity: 4-5  Duration:  every day/some days it is worse  Frequency: every day  Timing: all the time  Context:gets nervous or getting in a hurry  Modifying factors: nothing  Associated Signs and symptoms: can write/can eat/  Medication:none      Complaint: Sleep apnea   onset: Many years ago  Quality: Snores loudly and has apneic spells  Severity: Be severe   duration: Time he sleeps  Frequency: At night   timing: Anytime he sleeps in at night  Context: Sleeping  Modifying factors: Keeps in a recliner or chair  Associated Signs and symptoms: Fatigue during the day  Current meds:   No medications    The following portions of the patient's history were reviewed and updated as appropriate: allergies, current medications, past family history, past medical history, past social history, past surgical history and problem list.      Family History   Problem Relation Age of Onset   • Anuerysm Mother    • Lung cancer Father        Past Medical History:   Diagnosis  Date   • Arthritis    • Diabetes mellitus (HCC)    • Hyperlipidemia    • Hypertension        Social History     Socioeconomic History   • Marital status:    Tobacco Use   • Smoking status: Never Smoker   • Smokeless tobacco: Former User   Vaping Use   • Vaping Use: Never used   Substance and Sexual Activity   • Alcohol use: Not Currently     Comment: Beer. 1-2 times per year    • Drug use: Never   • Sexual activity: Defer         Current Outpatient Medications:   •  acetaminophen (TYLENOL) 500 MG tablet, Take 500 mg by mouth Every 6 (Six) Hours As Needed for Mild Pain ., Disp: , Rfl:   •  aspirin 81 MG EC tablet, Take 81 mg by mouth Daily., Disp: , Rfl:   •  atorvastatin (LIPITOR) 10 MG tablet, Take 10 mg by mouth Daily., Disp: , Rfl:   •  diclofenac (VOLTAREN) 75 MG EC tablet, Take 75 mg by mouth 2 (Two) Times a Day., Disp: , Rfl:   •  docusate sodium (COLACE) 100 MG capsule, Take 100 mg by mouth Daily., Disp: , Rfl:   •  Janumet  MG per tablet, Take 1 tablet by mouth 2 (Two) Times a Day., Disp: , Rfl:   •  traMADol (ULTRAM) 50 MG tablet, Take 50 mg by mouth 2 (Two) Times a Day., Disp: , Rfl:   •  triamterene-hydrochlorothiazide (MAXZIDE-25) 37.5-25 MG per tablet, Take 1 tablet by mouth Daily., Disp: , Rfl:   •  diazePAM (Valium) 5 MG tablet, Bring to MRI scan. Take one when you get there, may take 1 more., Disp: 2 tablet, Rfl: 0  •  primidone (MYSOLINE) 50 MG tablet, Take 1 pill at bedtime for 1 wk then 1 pill twice a day for 1 week, then 2 pill twice a day, Disp: 120 tablet, Rfl: 2    Review of Systems   Constitutional: Negative for fatigue and fever.   HENT: Negative for ear discharge and ear pain.    Eyes: Negative for pain and itching.   Respiratory: Negative for cough and shortness of breath.    Cardiovascular: Negative for chest pain.   Gastrointestinal: Negative for abdominal pain and nausea.   Endocrine: Negative for cold intolerance and heat intolerance.   Genitourinary: Negative for  frequency and urgency.   Musculoskeletal: Positive for back pain. Negative for neck pain.   Neurological: Positive for tremors. Negative for dizziness and light-headedness.   Psychiatric/Behavioral: Negative for agitation, confusion and sleep disturbance.        I have reviewed ROS completed by medical assistant.     Objective:    Neurologic Exam     Mental Status   Oriented to person, place, and time.   Speech: speech is normal     Cranial Nerves     CN III, IV, VI   Pupils are equal, round, and reactive to light.    Gait, Coordination, and Reflexes     Gait  Gait: normal    Coordination   Finger to nose coordination: normal  Tandem walking coordination: normal    Reflexes   Right brachioradialis: 2+  Left brachioradialis: 2+  Right biceps: 2+  Left biceps: 2+  Right triceps: 2+  Left triceps: 2+  Right patellar: 2+  Left patellar: 2+  Right achilles: 2+  Left achilles: 2+      Physical Exam  Vitals and nursing note reviewed.   Constitutional:       Appearance: Normal appearance. He is well-developed.   HENT:      Head: Normocephalic.      Nose: Nose normal.      Mouth/Throat:      Mouth: Mucous membranes are moist.   Eyes:      General: No visual field deficit.     Extraocular Movements: Extraocular movements intact.      Pupils: Pupils are equal, round, and reactive to light.   Cardiovascular:      Rate and Rhythm: Normal rate and regular rhythm.      Pulses: Normal pulses.      Heart sounds: Normal heart sounds, S1 normal and S2 normal.   Pulmonary:      Effort: Pulmonary effort is normal.   Musculoskeletal:         General: Normal range of motion.      Cervical back: Full passive range of motion without pain and normal range of motion.      Comments: 5/5 all extremities, Flexion and plantar flexion of feet BLE,  bilat    Skin:     General: Skin is warm and dry.   Neurological:      General: No focal deficit present.      Mental Status: He is alert and oriented to person, place, and time.      Cranial  Nerves: Cranial nerves are intact. No cranial nerve deficit, dysarthria or facial asymmetry.      Sensory: Sensation is intact.      Motor: Tremor (Bilateral hands and right leg, resting and action) present. No weakness, atrophy, abnormal muscle tone, seizure activity or pronator drift.      Coordination: Coordination is intact. Romberg sign negative. Coordination normal. Finger-Nose-Finger Test and Heel to Shin Test normal. Rapid alternating movements normal.      Gait: Gait is intact. Gait and tandem walk normal.      Deep Tendon Reflexes: Babinski sign absent on the right side. Babinski sign absent on the left side.      Reflex Scores:       Tricep reflexes are 2+ on the right side and 2+ on the left side.       Bicep reflexes are 2+ on the right side and 2+ on the left side.       Brachioradialis reflexes are 2+ on the right side and 2+ on the left side.       Patellar reflexes are 2+ on the right side and 2+ on the left side.       Achilles reflexes are 2+ on the right side and 2+ on the left side.  Psychiatric:         Attention and Perception: Attention normal.         Mood and Affect: Mood normal.         Speech: Speech normal.         Behavior: Behavior normal. Behavior is cooperative.         Thought Content: Thought content normal.       Discussion: The patient has tremors resting and with action.  He was notified that it may be very difficult to control these.  He was started on primidone for a trial and will be scheduled back.  He also will be sent for an MRI of the brain.  His wife reported that he snores and has apneic spells so he will also be sent for home sleep study.    Assessment/Plan:    Diagnoses and all orders for this visit:    1. Tremor, unspecified (Primary)  -     MRI Brain With & Without Contrast; Future  -     diazePAM (Valium) 5 MG tablet; Bring to MRI scan. Take one when you get there, may take 1 more.  Dispense: 2 tablet; Refill: 0  -     Creatinine, Serum; Future  -     primidone  (MYSOLINE) 50 MG tablet; Take 1 pill at bedtime for 1 wk then 1 pill twice a day for 1 week, then 2 pill twice a day  Dispense: 120 tablet; Refill: 2    2. Obstructive sleep apnea  -     Home Sleep Study; Future        Return in about 4 months (around 3/15/2022) for Radha Page.    I spent 39 minutes caring for Juan Alberto on this date of service. This time includes time spent by me in the following activities: reviewing tests, obtaining and/or reviewing a separately obtained history, performing a medically appropriate examination and/or evaluation, counseling and educating the patient/family/caregiver, ordering medications, tests, or procedures and documenting information in the medical record.      This document has been electronically signed by Radha COCHRAN on November 15, 2021 13:42 EST

## 2021-11-15 ENCOUNTER — OFFICE VISIT (OUTPATIENT)
Dept: NEUROLOGY | Facility: CLINIC | Age: 71
End: 2021-11-15

## 2021-11-15 VITALS
BODY MASS INDEX: 36.68 KG/M2 | DIASTOLIC BLOOD PRESSURE: 83 MMHG | SYSTOLIC BLOOD PRESSURE: 183 MMHG | WEIGHT: 262 LBS | HEIGHT: 71 IN | TEMPERATURE: 97.2 F | HEART RATE: 88 BPM

## 2021-11-15 DIAGNOSIS — R25.1 TREMOR, UNSPECIFIED: Primary | ICD-10-CM

## 2021-11-15 DIAGNOSIS — G47.33 OBSTRUCTIVE SLEEP APNEA: ICD-10-CM

## 2021-11-15 PROCEDURE — 99203 OFFICE O/P NEW LOW 30 MIN: CPT | Performed by: NURSE PRACTITIONER

## 2021-11-15 RX ORDER — PRIMIDONE 50 MG/1
TABLET ORAL
Qty: 120 TABLET | Refills: 2 | Status: SHIPPED | OUTPATIENT
Start: 2021-11-15 | End: 2022-01-06

## 2021-11-15 RX ORDER — DIAZEPAM 5 MG/1
TABLET ORAL
Qty: 2 TABLET | Refills: 0 | Status: SHIPPED | OUTPATIENT
Start: 2021-11-15

## 2021-11-18 ENCOUNTER — TELEPHONE (OUTPATIENT)
Dept: NEUROLOGY | Facility: CLINIC | Age: 71
End: 2021-11-18

## 2021-11-18 NOTE — TELEPHONE ENCOUNTER
Caller: CURRY  Relationship to Patient: [facility] MRI  Phone Number: [phone]    Reason for Call: HE STATES LABS ARE NEEDED D/T THE PT IS GOING TO BE RECEIVING CONTRAST AND D/T PT AGE THEY NEED TO CHECK THE BLOOD WORK, THEY STATE THEY NEED THESE LABS AS SOON AS POSSIBLE.     HE STATES POSSIBLY COULD GET PT TO GET MRI COMPLETED 11-24 AFTERNOON.

## 2021-11-18 NOTE — TELEPHONE ENCOUNTER
Called the patient to confirm where he is getting it done, in the referral it shows he has aptf to get the MRI done at [facility]

## 2021-11-23 ENCOUNTER — PATIENT ROUNDING (BHMG ONLY) (OUTPATIENT)
Dept: NEUROLOGY | Facility: CLINIC | Age: 71
End: 2021-11-23

## 2021-11-23 NOTE — PROGRESS NOTES
November 23, 2021    Hello, may I speak with Juan Alberto Thorpe?    My name is Rukhsana dawson      I am  with [facility]  [address] [place] [address] [address]  [place] [id number].    Before we get started may I verify your date of birth? 1950    I am calling to officially welcome you to our practice and ask about your recent visit. Is this a good time to talk? No- no answer           Thank you, and have a great day.

## 2021-11-30 ENCOUNTER — HOSPITAL ENCOUNTER (OUTPATIENT)
Dept: SLEEP MEDICINE | Facility: HOSPITAL | Age: 71
Discharge: HOME | End: 2021-11-30
Admitting: NURSE PRACTITIONER

## 2021-11-30 DIAGNOSIS — G47.33 OBSTRUCTIVE SLEEP APNEA: ICD-10-CM

## 2021-11-30 PROCEDURE — 95806 SLEEP STUDY UNATT&RESP EFFT: CPT

## 2021-11-30 PROCEDURE — 95806 SLEEP STUDY UNATT&RESP EFFT: CPT | Performed by: PSYCHIATRY & NEUROLOGY

## 2021-12-03 ENCOUNTER — TELEPHONE (OUTPATIENT)
Dept: NEUROLOGY | Facility: CLINIC | Age: 71
End: 2021-12-03

## 2021-12-03 NOTE — TELEPHONE ENCOUNTER
Caller: BURKE    Relationship: WIFE    Best call back number: [phone]    Caller requesting test results: WIFE    What test was performed: MRI BRAIN    When was the test performed: 11-24-21    Where was the test performed: [facility]

## 2021-12-07 NOTE — PROGRESS NOTES
I notified the patient of the results of the sleep test.  He is sleeping in a chair so the RDI may be much higher laying in the bed.  The patient does want to try to use a CPAP and may need a wide range of pressures due to the fact that he did the test sitting up in a chair.  They are not associated with any home health [facility].

## 2021-12-10 ENCOUNTER — APPOINTMENT (OUTPATIENT)
Dept: MRI IMAGING | Facility: HOSPITAL | Age: 71
End: 2021-12-10

## 2021-12-28 ENCOUNTER — TELEPHONE (OUTPATIENT)
Dept: NEUROLOGY | Facility: CLINIC | Age: 71
End: 2021-12-28

## 2021-12-28 DIAGNOSIS — G47.33 OBSTRUCTIVE SLEEP APNEA: Primary | ICD-10-CM

## 2021-12-28 NOTE — TELEPHONE ENCOUNTER
----- Message from Joseph F Seipel, MD sent at 12/1/2021  7:09 PM EST -----  Sleep test completed set up on auto CPAP 6-16    Pended order for supplies

## 2022-01-06 DIAGNOSIS — R25.1 TREMOR, UNSPECIFIED: ICD-10-CM

## 2022-01-06 RX ORDER — PRIMIDONE 50 MG/1
TABLET ORAL
Qty: 120 TABLET | Refills: 2 | Status: SHIPPED | OUTPATIENT
Start: 2022-01-06 | End: 2022-02-04

## 2022-02-03 DIAGNOSIS — R25.1 TREMOR, UNSPECIFIED: ICD-10-CM

## 2022-02-04 RX ORDER — PRIMIDONE 50 MG/1
TABLET ORAL
Qty: 120 TABLET | Refills: 2 | Status: SHIPPED | OUTPATIENT
Start: 2022-02-04 | End: 2022-03-16

## 2022-03-15 NOTE — PROGRESS NOTES
Subjective: tremor    Patient ID: Juan Alberto Thorpe is a 71 y.o. male.    History of Present Illness Mr. Thorpe is a very pleasant 71-year-old  male who presented today with his wife for a follow-up on tremor.  He has had a titration on primidone and has increased the dose to 100 mg  daily.  He states that the primidone has not made any changes in his tremor.  When he was initially seen the tremor had some resting attributes as well as some action attributes.  Sitting in the chair today the patient has complete resting tremor.     He also attempted to use the CPAP machine and had difficulty with the mask.  He states he will contact the DME company to see if he can get a different type of mask.            Sleep testing:  This patient is a 71-year-old male with symptoms of the obstructive sleep apnea syndrome.  Summary:  1.  The total acceptable monitoring time is 607 minutes.  A total of 607 minutes was in the supine position, with no sleep recorded in the nonsupine positions  2.  The AHI / RUBEN is 5.8 per hour.  3.  The average SPO2 was 95% the lowest SPO2 was 86%.  The total recording time associated with an SPO2 of less than 90% was 1.1 minutes.  4.  The average heart rate was 65 bpm, with maximum of 96 and minimum 57 bpm.  Impression:  This study demonstrates mild obstructive sleep apne   Obstructive sleep apnea: G47.33  Recommendation:  With the history of hypertension and diabetes and mild sleep apnea treatment with auto CPAP 6-16 cm H2O, with heated humidification is recommended.  Follow-up visit in 31 to 90 days after PAP set up with a smartcard download for verification of effectiveness and compliance.  Electronically signed by:       December 1, 2021  Joseph Seipel, MD   Date  Diplomat, American Board of Sleep Medicine           The following portions of the patient's history were reviewed and updated as appropriate: allergies, current medications, past family history, past medical history, past social  history, past surgical history and problem list.    Family History   Problem Relation Age of Onset   • Anuerysm Mother    • Lung cancer Father        Past Medical History:   Diagnosis Date   • Arthritis    • Diabetes mellitus (HCC)    • Hyperlipidemia    • Hypertension        Social History     Socioeconomic History   • Marital status:    Tobacco Use   • Smoking status: Never Smoker   • Smokeless tobacco: Former User   Vaping Use   • Vaping Use: Never used   Substance and Sexual Activity   • Alcohol use: Not Currently     Comment: Beer. 1-2 times per year    • Drug use: Never   • Sexual activity: Defer         Current Outpatient Medications:   •  acetaminophen (TYLENOL) 500 MG tablet, Take 500 mg by mouth Every 6 (Six) Hours As Needed for Mild Pain ., Disp: , Rfl:   •  aspirin 81 MG EC tablet, Take 81 mg by mouth Daily., Disp: , Rfl:   •  atorvastatin (LIPITOR) 10 MG tablet, Take 10 mg by mouth Daily., Disp: , Rfl:   •  diazePAM (Valium) 5 MG tablet, Bring to MRI scan. Take one when you get there, may take 1 more., Disp: 2 tablet, Rfl: 0  •  diclofenac (VOLTAREN) 75 MG EC tablet, Take 75 mg by mouth 2 (Two) Times a Day., Disp: , Rfl:   •  docusate sodium (COLACE) 100 MG capsule, Take 100 mg by mouth Daily., Disp: , Rfl:   •  Janumet  MG per tablet, Take 1 tablet by mouth 2 (Two) Times a Day., Disp: , Rfl:   •  traMADol (ULTRAM) 50 MG tablet, Take 50 mg by mouth 2 (Two) Times a Day., Disp: , Rfl:   •  triamterene-hydrochlorothiazide (MAXZIDE-25) 37.5-25 MG per tablet, Take 1 tablet by mouth Daily., Disp: , Rfl:   •  carbidopa-levodopa (SINEMET)  MG per tablet, Wk1: Take 1 tab at 9 am, WK2 Take 1 tab at 9 am and 12 noon, WK3: 1 tab three times a day,, Disp: 90 tablet, Rfl: 5    Review of Systems   Constitutional: Positive for activity change.   HENT: Positive for postnasal drip and tinnitus.    Eyes: Negative for pain and discharge.   Respiratory: Positive for shortness of breath.     Cardiovascular: Negative.    Gastrointestinal: Negative.    Endocrine: Negative for cold intolerance and heat intolerance.   Genitourinary: Positive for frequency and urgency.   Musculoskeletal: Negative.    Allergic/Immunologic: Positive for environmental allergies.   Neurological: Positive for tremors and weakness. Negative for dizziness.   Hematological: Bruises/bleeds easily.   Psychiatric/Behavioral: Positive for decreased concentration.          I have reviewed ROS completed by medical assistant.     Objective:    Neurologic Exam    Physical Exam  Vitals and nursing note reviewed.   Constitutional:       Appearance: Normal appearance. He is overweight.   HENT:      Head: Normocephalic.        Mouth/Throat:      Lips: Pink.      Mouth: Mucous membranes are moist.   Eyes:      General: Lids are normal.   Musculoskeletal:        Arms:         Legs:    Neurological:      Mental Status: He is alert.      Comments: The patient has a primarily resting tremor right arm greater than left.  No cogwheel rigidity.  + Hypomomia.  Bradykinetic movements.  No arm swing when ambulating.  Slight shuffle when walking.   Psychiatric:         Behavior: Behavior is cooperative.         Assessment/Plan: The patient has more of a Parkinson's type presentation in the office today.  I suggested a DaTscan however the patient does not want to go to [place] for this scan.  He did agree to a trial with carbidopa levodopa.  He was told to take the medication on empty stomach and to call if the doses up to 3 times daily and he has not seen some improvement.  His wife is going to check to see if the patient can get the DaTscan at [facility] which is closer to where they live.  If she can she will notify the office and I will place an order.       Diagnoses and all orders for this visit:    1. Tremor, unspecified (Primary)  -     carbidopa-levodopa (SINEMET)  MG per tablet; Wk1: Take 1 tab at 9 am, WK2 Take 1 tab at 9 am  and 12 noon, WK3: 1 tab three times a day,  Dispense: 90 tablet; Refill: 5    2. Obstructive sleep apnea    The patient's wife states she will contact the [employer] company and see if he can try a different mask.  The patient will then again try CPAP.      Return in about 4 months (around 7/16/2022) for Radha Page.     I spent 26  minutes caring for Juan Alberto on this date of service. This time includes time spent by me in the following activities: reviewing tests, obtaining and/or reviewing a separately obtained history, performing a medically appropriate examination and/or evaluation, counseling and educating the patient/family/caregiver, ordering medications, tests, or procedures and documenting information in the medical record.      This document has been electronically signed by DIMAS Aleman on March 16, 2022 16:20 EDT

## 2022-03-16 ENCOUNTER — OFFICE VISIT (OUTPATIENT)
Dept: NEUROLOGY | Facility: CLINIC | Age: 72
End: 2022-03-16

## 2022-03-16 VITALS
HEART RATE: 92 BPM | WEIGHT: 241 LBS | DIASTOLIC BLOOD PRESSURE: 86 MMHG | SYSTOLIC BLOOD PRESSURE: 166 MMHG | HEIGHT: 71 IN | TEMPERATURE: 97.6 F | BODY MASS INDEX: 33.74 KG/M2

## 2022-03-16 DIAGNOSIS — R25.1 TREMOR, UNSPECIFIED: Primary | ICD-10-CM

## 2022-03-16 DIAGNOSIS — G47.33 OBSTRUCTIVE SLEEP APNEA: ICD-10-CM

## 2022-03-16 PROCEDURE — 99213 OFFICE O/P EST LOW 20 MIN: CPT | Performed by: NURSE PRACTITIONER

## 2022-04-09 DIAGNOSIS — R25.1 TREMOR, UNSPECIFIED: ICD-10-CM

## 2022-05-03 ENCOUNTER — TELEPHONE (OUTPATIENT)
Dept: NEUROLOGY | Facility: CLINIC | Age: 72
End: 2022-05-03

## 2022-05-03 DIAGNOSIS — R25.1 TREMOR, UNSPECIFIED: ICD-10-CM

## 2022-05-03 RX ORDER — PRIMIDONE 50 MG/1
TABLET ORAL
Qty: 360 TABLET | Refills: 1 | Status: SHIPPED | OUTPATIENT
Start: 2022-05-03 | End: 2022-05-03

## 2022-05-03 NOTE — TELEPHONE ENCOUNTER
Caller: BURKE PALUMBO    Relationship: Emergency Contact    Best call back number: [phone]    What medications are you currently taking:   Current Outpatient Medications on File Prior to Visit   Medication Sig Dispense Refill   • acetaminophen (TYLENOL) 500 MG tablet Take 500 mg by mouth Every 6 (Six) Hours As Needed for Mild Pain .     • aspirin 81 MG EC tablet Take 81 mg by mouth Daily.     • atorvastatin (LIPITOR) 10 MG tablet Take 10 mg by mouth Daily.     • carbidopa-levodopa (SINEMET)  MG per tablet WK1: TAKE 1 TAB AT 9 AM, WK2 TAKE 1 TAB AT 9 AM AND 12 NOON, WK3: 1 TAB THREE TIMES A DAY 90 tablet 5   • diazePAM (Valium) 5 MG tablet Bring to MRI scan. Take one when you get there, may take 1 more. 2 tablet 0   • diclofenac (VOLTAREN) 75 MG EC tablet Take 75 mg by mouth 2 (Two) Times a Day.     • docusate sodium (COLACE) 100 MG capsule Take 100 mg by mouth Daily.     • Janumet  MG per tablet Take 1 tablet by mouth 2 (Two) Times a Day.     • primidone (MYSOLINE) 50 MG tablet TAKE 1 TABLET BY MOUTH AT BEDTIME X1WEEK, THEN 1 TABLET TWICE DAILY X1WEEK, THEN 2 TABLETS TWICE/ tablet 1   • traMADol (ULTRAM) 50 MG tablet Take 50 mg by mouth 2 (Two) Times a Day.     • triamterene-hydrochlorothiazide (MAXZIDE-25) 37.5-25 MG per tablet Take 1 tablet by mouth Daily.       No current facility-administered medications on file prior to visit.        Which medication are you concerned about:  PT'S WIFE CALLED TO SAY SHE THOUGHT HER  SHOULD BE GETTING  carbidopa-levodopa (SINEMET)  MG per tablet    PT'S WIFE WAS TOLD BY THE PHARM THAT THE FOLLOWING RX WAS ORDERED  primidone (MYSOLINE) 50 MG tablet    PT WAS LAST SEEN ON 3-16-22    Who prescribed you this medication:   CHATO ACOSTA    What are your concerns:   THE QUESTION IS, WHICH RX SHOULD THE PT BE TAKING. PT'S PHARM IS   [facility] [place].    PLEASE CALL PT'S WIFE TO LET HER KNOW WHICH RX THE PT SHOULD BE TAKING.    PT'S WIFE  IS ALSO ASKING IF THE PT IS TO CONTINUES WITH  carbidopa-levodopa  CAN THE DOSAGE BE INCREASED.

## 2022-07-08 NOTE — PROGRESS NOTES
Subjective:     Patient ID: Juan Alberto Thorpe is a 71 y.o. male.    History of Present Illness      Mr Thorpe is a 71 year old male whom  Presented today with his wife for a follow up visit.    He has had a trial on Sinemet 25/100 tid. He states that the Sinement will stop his tremor but the medicine does not last all day.    He was notified the each C/L pill should last 4 hours.  The patient states that he gets up at 8 am and goes to bed at 3 am.   He was notified that he would need 5 pills per day to cover those hours.  He was again notified that protein would bind with Sinemet and make it not work, to take the meds on an empty stomach. He was also notified of dyskinesias and possible hallucinations at high doses of C/L.   He denied shuffling, or freezing up. He denied any trouble swallowing.   I again offered to send him for a Sánchez Scan however the patient deferred, stating that he did not want to go to [place] or [place] for the test.       The following portions of the patient's history were reviewed and updated as appropriate: allergies, current medications, past family history, past medical history, past social history, past surgical history and problem list.    Family History   Problem Relation Age of Onset   • Anuerysm Mother    • Lung cancer Father        Past Medical History:   Diagnosis Date   • Arthritis    • Diabetes mellitus (HCC)    • Hyperlipidemia    • Hypertension        Social History     Socioeconomic History   • Marital status:    Tobacco Use   • Smoking status: Never Smoker   • Smokeless tobacco: Former User   Vaping Use   • Vaping Use: Never used   Substance and Sexual Activity   • Alcohol use: Not Currently     Comment: Beer. 1-2 times per year    • Drug use: Never   • Sexual activity: Defer         Current Outpatient Medications:   •  acetaminophen (TYLENOL) 500 MG tablet, Take 500 mg by mouth Every 6 (Six) Hours As Needed for Mild Pain ., Disp: , Rfl:   •  aspirin 81 MG EC tablet,  Take 81 mg by mouth Daily., Disp: , Rfl:   •  atorvastatin (LIPITOR) 10 MG tablet, Take 10 mg by mouth Daily., Disp: , Rfl:   •  carbidopa-levodopa (SINEMET)  MG per tablet, Take 1 tab at 8am, noon, 4 pm, 8pm, 11pm, Disp: 450 tablet, Rfl: 3  •  diazePAM (Valium) 5 MG tablet, Bring to MRI scan. Take one when you get there, may take 1 more., Disp: 2 tablet, Rfl: 0  •  diclofenac (VOLTAREN) 75 MG EC tablet, Take 75 mg by mouth 2 (Two) Times a Day., Disp: , Rfl:   •  docusate sodium (COLACE) 100 MG capsule, Take 100 mg by mouth Daily., Disp: , Rfl:   •  Janumet  MG per tablet, Take 1 tablet by mouth 2 (Two) Times a Day., Disp: , Rfl:   •  traMADol (ULTRAM) 50 MG tablet, Take 50 mg by mouth 2 (Two) Times a Day., Disp: , Rfl:   •  triamterene-hydrochlorothiazide (MAXZIDE-25) 37.5-25 MG per tablet, Take 1 tablet by mouth Daily., Disp: , Rfl:     Review of Systems   Constitutional: Negative.    HENT: Negative.    Eyes: Negative.    Respiratory: Negative.    Cardiovascular: Negative.    Gastrointestinal: Negative.    Endocrine: Negative.    Genitourinary: Negative.    Musculoskeletal: Positive for arthralgias and back pain.   Skin: Negative.    Allergic/Immunologic: Negative.    Neurological: Positive for tremors.   Hematological: Bruises/bleeds easily.   Psychiatric/Behavioral: Negative.           I have reviewed ROS completed by medical assistant.     Objective:    Neurologic Exam     Mental Status   Oriented to person, place, and time.   Speech: speech is normal     Cranial Nerves     CN III, IV, VI   Pupils are equal, round, and reactive to light.      Physical Exam  Vitals and nursing note reviewed.   Constitutional:       Appearance: Normal appearance. He is well-developed and overweight.   HENT:      Head: Normocephalic.      Nose: Nose normal.      Mouth/Throat:      Mouth: Mucous membranes are moist.   Eyes:      General: Lids are normal.      Extraocular Movements: Extraocular movements intact.       Pupils: Pupils are equal, round, and reactive to light.   Pulmonary:      Effort: Pulmonary effort is normal.   Musculoskeletal:        Arms:       Comments: Strength 5/5 all extremities    Skin:     General: Skin is warm and dry.   Neurological:      General: No focal deficit present.      Mental Status: He is alert and oriented to person, place, and time.      Cranial Nerves: No cranial nerve deficit.      Sensory: No sensory deficit.      Motor: No weakness.      Coordination: Coordination normal.      Gait: Gait normal.      Deep Tendon Reflexes: Reflexes normal.   Psychiatric:         Attention and Perception: Attention normal.         Mood and Affect: Mood normal.         Speech: Speech normal.         Behavior: Behavior normal. Behavior is cooperative.         Assessment/Plan:      Diagnoses and all orders for this visit:    1. Parkinson's disease (HCC)  -     carbidopa-levodopa (SINEMET)  MG per tablet; Take 1 tab at 8am, noon, 4 pm, 8pm, 11pm  Dispense: 450 tablet; Refill: 3      The patient is to call with any concerns.     Return in about 6 months (around 1/11/2023) for Next scheduled follow up Dr Seipel .     I spent 40  minutes caring for Juan Alberto on this date of service. This time includes time spent by me in the following activities: obtaining and/or reviewing a separately obtained history, performing a medically appropriate examination and/or evaluation, counseling and educating the patient/family/caregiver, ordering medications, tests, or procedures and documenting information in the medical record.      This document has been electronically signed by DIMAS Aleman on July 11, 2022 17:26 EDT

## 2022-07-11 ENCOUNTER — OFFICE VISIT (OUTPATIENT)
Dept: NEUROLOGY | Facility: CLINIC | Age: 72
End: 2022-07-11

## 2022-07-11 VITALS — SYSTOLIC BLOOD PRESSURE: 162 MMHG | HEART RATE: 76 BPM | DIASTOLIC BLOOD PRESSURE: 87 MMHG | TEMPERATURE: 99.4 F

## 2022-07-11 DIAGNOSIS — G20 PARKINSON'S DISEASE: ICD-10-CM

## 2022-07-11 PROCEDURE — 99215 OFFICE O/P EST HI 40 MIN: CPT | Performed by: NURSE PRACTITIONER

## 2022-10-13 DIAGNOSIS — G20 PARKINSON'S DISEASE: ICD-10-CM

## 2023-01-17 NOTE — PROGRESS NOTES
Chief Complaint  Follow-up (Parkinson)    Subjective          Juan Alberto MATHIEU Thorpe presents to [facility] for PARKINSON'S  History of Present Illness  Patient is here to f/u on parkinson's, patient states he has good and bad days, he denies hallucinations and any new issues he currently takes sinemet 25/100 1 tab 5 times qd, patient states tremors in his right hand ia about the same since last visit            ===previous ov 7/11/22 saima stack black aprn====  History of Present Illness       Mr Thorpe is a 71 year old male whom  Presented today with his wife for a follow up visit.    He has had a trial on Sinemet 25/100 tid. He states that the Sinement will stop his tremor but the medicine does not last all day.    He was notified the each C/L pill should last 4 hours.  The patient states that he gets up at 8 am and goes to bed at 3 am.   He was notified that he would need 5 pills per day to cover those hours.  He was again notified that protein would bind with Sinemet and make it not work, to take the meds on an empty stomach. He was also notified of dyskinesias and possible hallucinations at high doses of C/L.   He denied shuffling, or freezing up. He denied any trouble swallowing.     I again offered to send him for a Sánchez Scan however the patient deferred, stating that he did not want to go to [place] or [place] for the test.         Current Outpatient Medications:   •  acetaminophen (TYLENOL) 500 MG tablet, Take 500 mg by mouth Every 6 (Six) Hours As Needed for Mild Pain ., Disp: , Rfl:   •  aspirin 81 MG EC tablet, Take 81 mg by mouth Daily., Disp: , Rfl:   •  atorvastatin (LIPITOR) 10 MG tablet, Take 10 mg by mouth Daily., Disp: , Rfl:   •  diazePAM (Valium) 5 MG tablet, Bring to MRI scan. Take one when you get there, may take 1 more., Disp: 2 tablet, Rfl: 0  •  diclofenac (VOLTAREN) 75 MG EC tablet, Take 75 mg by mouth 2 (Two) Times a Day., Disp: , Rfl:   •  docusate sodium  "(COLACE) 100 MG capsule, Take 100 mg by mouth Daily., Disp: , Rfl:   •  Janumet  MG per tablet, Take 1 tablet by mouth 2 (Two) Times a Day., Disp: , Rfl:   •  traMADol (ULTRAM) 50 MG tablet, Take 50 mg by mouth 2 (Two) Times a Day., Disp: , Rfl:   •  triamterene-hydrochlorothiazide (MAXZIDE-25) 37.5-25 MG per tablet, Take 1 tablet by mouth Daily., Disp: , Rfl:   •  carbidopa-levodopa CR (SINEMET CR)  MG per CR tablet, Take 1 tablet by mouth 2 (Two) Times a Day., Disp: 60 tablet, Rfl: 5    Review of Systems   Musculoskeletal: Positive for arthralgias, gait problem, joint swelling and myalgias.   All other systems reviewed and are negative.         Objective:    Vital Signs:   /88   Pulse 78   Temp 98.6 °F (37 °C) (Infrared)   Ht 180.3 cm (71\")   Wt 114 kg (251 lb)   BMI 35.01 kg/m²     Physical Exam  Vitals reviewed.   Cardiovascular:      Pulses: Normal pulses.   Pulmonary:      Effort: Pulmonary effort is normal. No respiratory distress.   Neurological:      Mental Status: He is alert and oriented to person, place, and time.      Comments: Mild parkinson tremor on the right.    Psychiatric:         Mood and Affect: Mood normal.        Result Review :                Neurologic Exam     Mental Status   Oriented to person, place, and time.         Assessment and Plan    Diagnoses and all orders for this visit:    1. Parkinson's disease (HCC) (Primary)  -     carbidopa-levodopa CR (SINEMET CR)  MG per CR tablet; Take 1 tablet by mouth 2 (Two) Times a Day.  Dispense: 60 tablet; Refill: 5     change to sinemet cr  8am and 3pm and 1/2 or one sinemet 25/100 up to total of two tabs per day     Follow Up   Return in about 6 months (around 7/18/2023) for fu with Radha.  Patient was given instructions and counseling regarding his condition or for health maintenance advice. Please see specific information pulled into the AVS if appropriate.     This document has been electronically signed by " Joseph Seipel, MD on January 18, 2023 14:12 EST

## 2023-01-18 ENCOUNTER — OFFICE VISIT (OUTPATIENT)
Dept: NEUROLOGY | Facility: CLINIC | Age: 73
End: 2023-01-18
Payer: MEDICARE

## 2023-01-18 VITALS
SYSTOLIC BLOOD PRESSURE: 164 MMHG | WEIGHT: 251 LBS | HEIGHT: 71 IN | TEMPERATURE: 98.6 F | DIASTOLIC BLOOD PRESSURE: 88 MMHG | BODY MASS INDEX: 35.14 KG/M2 | HEART RATE: 78 BPM

## 2023-01-18 DIAGNOSIS — G20 PARKINSON'S DISEASE: Primary | ICD-10-CM

## 2023-01-18 PROCEDURE — 99214 OFFICE O/P EST MOD 30 MIN: CPT | Performed by: PSYCHIATRY & NEUROLOGY

## 2023-01-18 RX ORDER — CARBIDOPA AND LEVODOPA 50; 200 MG/1; MG/1
1 TABLET, EXTENDED RELEASE ORAL 2 TIMES DAILY
Qty: 60 TABLET | Refills: 5 | Status: SHIPPED | OUTPATIENT
Start: 2023-01-18 | End: 2023-02-13

## 2023-02-10 DIAGNOSIS — G20 PARKINSON'S DISEASE: ICD-10-CM

## 2023-02-13 RX ORDER — CARBIDOPA AND LEVODOPA 50; 200 MG/1; MG/1
TABLET, EXTENDED RELEASE ORAL
Qty: 60 TABLET | Refills: 5 | Status: SHIPPED | OUTPATIENT
Start: 2023-02-13

## 2023-05-08 DIAGNOSIS — G20 PARKINSON'S DISEASE: ICD-10-CM

## 2023-05-08 NOTE — TELEPHONE ENCOUNTER
"  Caller: BURKE PALUMBO    Relationship: Emergency Contact    Best call back number: [phone]    Requested Prescriptions:   Requested Prescriptions     Pending Prescriptions Disp Refills   • carbidopa-levodopa (SINEMET)  MG per tablet 270 tablet 1     Sig: WK1: TAKE 1 TAB AT 9 AM, WK2 TAKE 1 TAB AT 9 AM AND 12 NOON, WK3: 1 TAB THREE TIMES A DAY        Pharmacy where request should be sent: [facility]ACY #[id number] - [place], [place]  [address] [place] [address] [address] AT [facility] - [phone] [facility] [phone]      Last office visit with prescribing clinician: 1/18/2023   Last telemedicine visit with prescribing clinician: 7/27/2023   Next office visit with prescribing clinician: 7/27/2023     Additional details provided by patient: HAS ABOUT 10 TABLETS LEFT    Does the patient have less than a 3 day supply:  [] Yes  [x] No    Would you like a call back once the refill request has been completed: [] Yes [x] No    If the office needs to give you a call back, can they leave a voicemail: [] Yes [x] No    Erick Fraga Rep   05/08/23 12:17 EDT       "

## 2023-07-27 ENCOUNTER — OFFICE VISIT (OUTPATIENT)
Dept: NEUROLOGY | Facility: CLINIC | Age: 73
End: 2023-07-27
Payer: MEDICARE

## 2023-07-27 VITALS
BODY MASS INDEX: 34.3 KG/M2 | HEART RATE: 101 BPM | DIASTOLIC BLOOD PRESSURE: 75 MMHG | SYSTOLIC BLOOD PRESSURE: 135 MMHG | WEIGHT: 245 LBS | HEIGHT: 71 IN

## 2023-07-27 DIAGNOSIS — G20 PARKINSON'S DISEASE: ICD-10-CM

## 2023-07-27 PROCEDURE — 99214 OFFICE O/P EST MOD 30 MIN: CPT | Performed by: PSYCHIATRY & NEUROLOGY

## 2023-07-27 PROCEDURE — 1159F MED LIST DOCD IN RCRD: CPT | Performed by: PSYCHIATRY & NEUROLOGY

## 2023-07-27 PROCEDURE — 1160F RVW MEDS BY RX/DR IN RCRD: CPT | Performed by: PSYCHIATRY & NEUROLOGY

## 2023-07-27 RX ORDER — CARBIDOPA AND LEVODOPA 50; 200 MG/1; MG/1
1 TABLET, EXTENDED RELEASE ORAL 3 TIMES DAILY
Qty: 270 TABLET | Refills: 3 | Status: SHIPPED | OUTPATIENT
Start: 2023-07-27

## 2023-07-27 NOTE — PROGRESS NOTES
Chief Complaint  Parkinson's Disease    Subjective          Juan Alberto MATHIEU Thorpe presents to [facility] for PARKINSON'S  History of Present Illness  Patient is here to f/u on parkinsons  He states that he has good and bad days, but more good than bad.  He currently take Carbadopa Levadopa CR. 50/200 and regular 25/100 tid  He denies falls or trouble with ambulation  He has increased the sinemet to taking both the cr and the regular tab tid on a regular basis, if he is late on taken the medication he does have significant tremor              ===PREV. OV 1/18/23===  Patient is here to f/u on parkinson's, patient states he has good and bad days, he denies hallucinations and any new issues he currently takes sinemet 25/100 1 tab 5 times qd, patient states tremors in his right hand ia about the same since last visit     Current Outpatient Medications:     acetaminophen (TYLENOL) 500 MG tablet, Take 1 tablet by mouth Every 6 (Six) Hours As Needed for Mild Pain., Disp: , Rfl:     aspirin 81 MG EC tablet, Take 1 tablet by mouth Daily., Disp: , Rfl:     atorvastatin (LIPITOR) 10 MG tablet, Take 1 tablet by mouth Daily., Disp: , Rfl:     carbidopa-levodopa (SINEMET)  MG per tablet, Take 1 tablet by mouth 3 (Three) Times a Day. 1/2 or 1 tab bid, Disp: 270 tablet, Rfl: 3    carbidopa-levodopa CR (SINEMET CR)  MG per CR tablet, Take 1 tablet by mouth 3 (Three) Times a Day., Disp: 270 tablet, Rfl: 3    diclofenac (VOLTAREN) 75 MG EC tablet, Take 1 tablet by mouth 2 (Two) Times a Day., Disp: , Rfl:     docusate sodium (COLACE) 100 MG capsule, Take 1 capsule by mouth Daily., Disp: , Rfl:     Janumet  MG per tablet, Take 1 tablet by mouth 2 (Two) Times a Day., Disp: , Rfl:     traMADol (ULTRAM) 50 MG tablet, Take 1 tablet by mouth 2 (Two) Times a Day., Disp: , Rfl:     triamterene-hydrochlorothiazide (MAXZIDE-25) 37.5-25 MG per tablet, Take 1 tablet by mouth Daily., Disp: , Rfl:     Review of  "Systems   Constitutional:  Positive for fatigue.   HENT:  Positive for postnasal drip and tinnitus.    Eyes: Negative.    Respiratory:  Positive for shortness of breath.    Gastrointestinal: Negative.    Endocrine: Negative.    Genitourinary: Negative.    Musculoskeletal:  Positive for arthralgias, back pain and joint swelling.   Allergic/Immunologic: Positive for environmental allergies.   Neurological:  Positive for tremors.   Hematological:  Bruises/bleeds easily.   Psychiatric/Behavioral: Negative.          Objective:    Vital Signs:   /75   Pulse 101   Ht 180.3 cm (71\")   Wt 111 kg (245 lb)   BMI 34.17 kg/m²     Physical Exam  Vitals reviewed.   Cardiovascular:      Pulses: Normal pulses.   Pulmonary:      Effort: Pulmonary effort is normal.   Neurological:      Mental Status: He is alert and oriented to person, place, and time.      Comments: Notes resting tremor in the right hand    Psychiatric:         Mood and Affect: Mood normal.      Result Review :                Neurologic Exam     Mental Status   Oriented to person, place, and time.       Assessment and Plan    Diagnoses and all orders for this visit:    1. Parkinson's disease  -     carbidopa-levodopa (SINEMET)  MG per tablet; Take 1 tablet by mouth 3 (Three) Times a Day. 1/2 or 1 tab bid  Dispense: 270 tablet; Refill: 3  -     carbidopa-levodopa CR (SINEMET CR)  MG per CR tablet; Take 1 tablet by mouth 3 (Three) Times a Day.  Dispense: 270 tablet; Refill: 3     He has had some progression of symptoms therefor  continue taking the sienmet on a regular tid dose schedule.     Follow Up   Return in about 6 months (around 1/27/2024).  Patient was given instructions and counseling regarding his condition or for health maintenance advice. Please see specific information pulled into the AVS if appropriate.     This document has been electronically signed by Joseph Seipel, MD on July 27, 2023 15:08 EDT      "

## 2024-06-24 DIAGNOSIS — G20.A1 PARKINSON'S DISEASE: ICD-10-CM

## 2024-07-25 DIAGNOSIS — G20.A1 PARKINSON'S DISEASE: ICD-10-CM

## 2024-07-26 RX ORDER — CARBIDOPA AND LEVODOPA 50; 200 MG/1; MG/1
1 TABLET, EXTENDED RELEASE ORAL 3 TIMES DAILY
Qty: 90 TABLET | Refills: 0 | Status: SHIPPED | OUTPATIENT
Start: 2024-07-26

## 2024-08-10 DIAGNOSIS — G20.A1 PARKINSON'S DISEASE: ICD-10-CM

## 2024-08-20 NOTE — PROGRESS NOTES
Subjective: Parkinson's Disease     Patient ID: Juna Alberto Thorpe is a 73 y.o. male.    History of Present Illness  Complaint: Parkinson's   Onset:   Tremor- states medication is helping. Not shaking as bad  Falls/imbalance- has fell 1 time  Trouble eating/swallowing/freezing up when walking- none  Modifying factors:   Associated Signs and symptoms:     Current medications: Sinemet CR  3 times daily                                     Sinemet  3 times daily              Testing  MRI of the brain completed at [facility] an open MRI 11/24/2021  Impression:  1.  No evidence of acute intracranial abnormality  2.  Age-related atrophy and mild microvascular changes.  3.  Sinus disease.  Electronically signed by Rivas Foley MD 1 11/24/2021    Dr. Seipel Office Visit  7-  Patient is here to f/u on parkinsons  He states that he has good and bad days, but more good than bad.  He currently take Carbadopa Levadopa CR. 50/200 and regular 25/100 tid  He denies falls or trouble with ambulation  He has increased the sinemet to taking both the cr and the regular tab tid on a regular basis, if he is late on taken the medication he does have significant tremor    The following portions of the patient's history were reviewed and updated as appropriate: allergies, current medications, past family history, past medical history, past social history, past surgical history and problem list.    Family History   Problem Relation Age of Onset    Anuerysm Mother     Neuropathy Mother     Lung cancer Father        Past Medical History:   Diagnosis Date    Arthritis     Diabetes mellitus     HL (hearing loss)     Ringing in ears    Hyperlipidemia     Hypertension     Movement disorder     Sleep apnea     Unable to wear cpap    Vision loss     Glasses       Social History     Socioeconomic History    Marital status:    Tobacco Use    Smoking status: Never    Smokeless tobacco: Former   Vaping Use    Vaping status: Never Used    Substance and Sexual Activity    Alcohol use: Not Currently     Comment: Beer. 1-2 times per year     Drug use: Never    Sexual activity: Defer         Current Outpatient Medications:     acetaminophen (TYLENOL) 500 MG tablet, Take 1 tablet by mouth Every 6 (Six) Hours As Needed for Mild Pain., Disp: , Rfl:     aspirin 81 MG EC tablet, Take 1 tablet by mouth Daily., Disp: , Rfl:     atorvastatin (LIPITOR) 10 MG tablet, Take 1 tablet by mouth Daily., Disp: , Rfl:     carbidopa-levodopa (SINEMET)  MG per tablet, Take 1 tablet by mouth 4 (Four) Times a Day., Disp: 360 tablet, Rfl: 3    carbidopa-levodopa CR (SINEMET CR)  MG per CR tablet, Take 1 tablet by mouth 4 (Four) Times a Day., Disp: 360 tablet, Rfl: 3    diclofenac (VOLTAREN) 75 MG EC tablet, Take 1 tablet by mouth 2 (Two) Times a Day., Disp: , Rfl:     docusate sodium (COLACE) 100 MG capsule, Take 1 capsule by mouth Daily., Disp: , Rfl:     Janumet  MG per tablet, Take 1 tablet by mouth 2 (Two) Times a Day., Disp: , Rfl:     traMADol (ULTRAM) 50 MG tablet, Take 1 tablet by mouth 2 (Two) Times a Day., Disp: , Rfl:     triamterene-hydrochlorothiazide (MAXZIDE-25) 37.5-25 MG per tablet, Take 1 tablet by mouth Daily., Disp: , Rfl:     Review of Systems   Constitutional:  Positive for fatigue.   HENT:  Positive for postnasal drip and tinnitus.    Respiratory:  Positive for shortness of breath.    Endocrine: Positive for heat intolerance.   Genitourinary:  Positive for urgency.   Musculoskeletal:  Positive for back pain and gait problem.   Neurological:  Positive for numbness.   Hematological:  Bruises/bleeds easily.   Psychiatric/Behavioral:  Positive for decreased concentration and sleep disturbance.    All other systems reviewed and are negative.         I have reviewed ROS completed by medical assistant.     Objective:    Neurological Exam  Mental Status  Awake, alert and oriented to person, place and time. Level of consciousness: Presented  disheveled. Oriented to person, place, and time. Speech is normal. no dysarthria present.    Cranial Nerves  CN II: Right visual acuity: Finger movement. Left visual acuity: Finger movement.  CN III, IV, VI: Extraocular movements intact bilaterally. Normal lids and orbits bilaterally. Pupils equal round and reactive to light bilaterally.    Motor  Normal muscle bulk throughout. No fasciculations present. The following abnormal movements were seen: Tremor in both hands and both legs.   Strength is 5/5 throughout all four extremities.    Sensory  Sensation is intact to light touch, pinprick, vibration and proprioception in all four extremities.    Coordination  TremorsRight: Finger-to-nose normal.    Gait   Abnormal gait (Small steps, decreased arm swing). Small steps, decreased arm swing..       Physical Exam  Vitals reviewed.   Constitutional:       Appearance: He is well-developed and well-groomed. He is obese.   HENT:      Head: Normocephalic and atraumatic.      Right Ear: Hearing normal.      Left Ear: Hearing normal.      Nose: Nose normal.      Mouth/Throat:      Lips: Pink.      Mouth: Mucous membranes are moist.   Eyes:      General: Lids are normal. No visual field deficit.     Extraocular Movements: Extraocular movements intact.      Pupils: Pupils are equal, round, and reactive to light.   Cardiovascular:      Rate and Rhythm: Normal rate.   Pulmonary:      Effort: Pulmonary effort is normal.   Musculoskeletal:         General: Normal range of motion.      Cervical back: Normal range of motion.   Skin:     General: Skin is warm and dry.   Neurological:      General: No focal deficit present.      Mental Status: He is oriented to person, place, and time.      Cranial Nerves: No cranial nerve deficit, dysarthria or facial asymmetry.      Sensory: No sensory deficit.      Motor: Motor strength is normal.No weakness or tremor.      Coordination: Coordination normal.      Gait: Gait abnormal (Small steps,  decreased arm swing).      Deep Tendon Reflexes: Reflexes normal.   Psychiatric:         Attention and Perception: Perception normal. He is inattentive.         Mood and Affect: Affect is flat.         Speech: Speech normal.         Behavior: Behavior is cooperative.         Thought Content: Thought content normal.       Assessment/Plan:  The patient and his family were educated that he should take the medication on the dosing schedule that I gave to them.  This includes 7 AM, 1 PM, 7 PM and 1 AM.  This would take the patient from 7 AM to 3 AM with coverage of meds.  They are to call the clinic if they have any questions otherwise he will be scheduled back for follow-up in 6 months.  Diagnoses and all orders for this visit:    1. Parkinson's disease  -     carbidopa-levodopa CR (SINEMET CR)  MG per CR tablet; Take 1 tablet by mouth 4 (Four) Times a Day.  Dispense: 360 tablet; Refill: 3  -     carbidopa-levodopa (SINEMET)  MG per tablet; Take 1 tablet by mouth 4 (Four) Times a Day.  Dispense: 360 tablet; Refill: 3          Return in about 6 months (around 2/21/2025), or Radha Page.     I spent 35 minutes caring for Juan Alberto on this date of service. This time includes time spent by me in the following activities: preparing for the visit, reviewing tests, obtaining and/or reviewing a separately obtained history, performing a medically appropriate examination and/or evaluation, counseling and educating the patient/family/caregiver, ordering medications, tests, or procedures, and documenting information in the medical record.      This document has been electronically signed by DIMAS Aleman on August 21, 2024 16:56 EDT

## 2024-08-21 ENCOUNTER — OFFICE VISIT (OUTPATIENT)
Dept: NEUROLOGY | Facility: CLINIC | Age: 74
End: 2024-08-21
Payer: MEDICARE

## 2024-08-21 VITALS
WEIGHT: 253 LBS | SYSTOLIC BLOOD PRESSURE: 135 MMHG | HEIGHT: 71 IN | DIASTOLIC BLOOD PRESSURE: 66 MMHG | HEART RATE: 112 BPM | BODY MASS INDEX: 35.42 KG/M2

## 2024-08-21 DIAGNOSIS — G20.A1 PARKINSON'S DISEASE: Chronic | ICD-10-CM

## 2024-08-21 PROCEDURE — 99214 OFFICE O/P EST MOD 30 MIN: CPT | Performed by: NURSE PRACTITIONER

## 2024-08-21 PROCEDURE — 1160F RVW MEDS BY RX/DR IN RCRD: CPT | Performed by: NURSE PRACTITIONER

## 2024-08-21 PROCEDURE — 1159F MED LIST DOCD IN RCRD: CPT | Performed by: NURSE PRACTITIONER

## 2024-08-21 RX ORDER — CARBIDOPA AND LEVODOPA 50; 200 MG/1; MG/1
1 TABLET, EXTENDED RELEASE ORAL 4 TIMES DAILY
Qty: 360 TABLET | Refills: 3 | Status: SHIPPED | OUTPATIENT
Start: 2024-08-21

## 2024-08-27 DIAGNOSIS — G20.A1 PARKINSON'S DISEASE: Chronic | ICD-10-CM

## 2024-08-27 RX ORDER — CARBIDOPA AND LEVODOPA 50; 200 MG/1; MG/1
1 TABLET, EXTENDED RELEASE ORAL 3 TIMES DAILY
Qty: 90 TABLET | Refills: 3 | Status: SHIPPED | OUTPATIENT
Start: 2024-08-27

## 2024-11-19 DIAGNOSIS — G20.A1 PARKINSON'S DISEASE: Chronic | ICD-10-CM

## 2024-11-19 RX ORDER — CARBIDOPA AND LEVODOPA 50; 200 MG/1; MG/1
1 TABLET, EXTENDED RELEASE ORAL 3 TIMES DAILY
Qty: 270 TABLET | Refills: 1 | Status: SHIPPED | OUTPATIENT
Start: 2024-11-19

## 2025-02-03 DIAGNOSIS — G20.A1 PARKINSON'S DISEASE: ICD-10-CM

## 2025-02-05 RX ORDER — CARBIDOPA AND LEVODOPA 25; 100 MG/1; MG/1
TABLET ORAL
Qty: 180 TABLET | Refills: 3 | Status: SHIPPED | OUTPATIENT
Start: 2025-02-05

## 2025-02-24 NOTE — PROGRESS NOTES
Subjective: Tremor/parkinsonism    Patient ID: Juan Alberto Thorpe is a 74 y.o. male.    History of Present Illness Mr. Thorpe is a 74-year-old male who has been followed by this office for a parkinsonism.  He has been on primidone with no improvement.  He has not had a DaTscan.  The patient reports that his tremor is almost nearly gone.  He denies any side effects from the carbidopa levodopa.  He denies any extra movements, falls, trouble eating or swallowing, freezing up or stuttering when walking.  He is very pleased his wife is also.    He does have moderate back pain and reports he has 3 fractured vertebrae.  I asked the patient if he would like a trial on some gabapentin to help with his discomfort and he stated yes.  I told him that if it makes him too drowsy to back down on the dose but to start taking 1 at bedtime to help him rest.      Complaint: Parkinsonism   Onset:   Tremor- states medication is helping. Not shaking as bad  Falls/imbalance- no  Trouble eating/swallowing/freezing up when walking- none  Modifying factors:   Associated Signs and symptoms:     Past medications: Sinemet CR  4 times daily                                Sinemet  4 times daily                                 MRI of the brain at [facility] an open sided MRI and CT completed on 11/24/2021.  Impression:  1.  No evidence of acute intracranial abnormality  2.  Age-related atrophy and microvascular changes.  3 sinus disease.  Electronically signed by Rivas Foley MD 24 November 2021          The following portions of the patient's history were reviewed and updated as appropriate: allergies, current medications, past family history, past medical history, past social history, past surgical history and problem list.    Family History   Problem Relation Age of Onset    Anuerysm Mother     Neuropathy Mother     Lung cancer Father        Past Medical History:   Diagnosis Date    Arthritis     Diabetes mellitus     HL (hearing loss)      Ringing in ears    Hyperlipidemia     Hypertension     Movement disorder     Sleep apnea     Unable to wear cpap    Vision loss     Glasses       Social History     Socioeconomic History    Marital status:    Tobacco Use    Smoking status: Never    Smokeless tobacco: Former   Vaping Use    Vaping status: Never Used   Substance and Sexual Activity    Alcohol use: Not Currently     Comment: Beer. 1-2 times per year     Drug use: Never    Sexual activity: Defer         Current Outpatient Medications:     acetaminophen (TYLENOL) 500 MG tablet, Take 1 tablet by mouth Every 6 (Six) Hours As Needed for Mild Pain., Disp: , Rfl:     aspirin 81 MG EC tablet, Take 1 tablet by mouth Daily., Disp: , Rfl:     atorvastatin (LIPITOR) 10 MG tablet, Take 1 tablet by mouth Daily., Disp: , Rfl:     carbidopa-levodopa (SINEMET)  MG per tablet, Take 1 tablet by mouth 4 (Four) Times a Day., Disp: 180 tablet, Rfl: 3    diclofenac (VOLTAREN) 75 MG EC tablet, Take 1 tablet by mouth 2 (Two) Times a Day., Disp: , Rfl:     docusate sodium (COLACE) 100 MG capsule, Take 1 capsule by mouth Daily., Disp: , Rfl:     Janumet  MG per tablet, Take 1 tablet by mouth 2 (Two) Times a Day., Disp: , Rfl:     traMADol (ULTRAM) 50 MG tablet, Take 1 tablet by mouth 2 (Two) Times a Day., Disp: , Rfl:     triamterene-hydrochlorothiazide (MAXZIDE-25) 37.5-25 MG per tablet, Take 1 tablet by mouth Daily., Disp: , Rfl:     carbidopa-levodopa CR (SINEMET CR)  MG per CR tablet, Take 1 tablet by mouth 4 (Four) Times a Day., Disp: 360 tablet, Rfl: 3    gabapentin (NEURONTIN) 300 MG capsule, Take 1 capsule by mouth 3 (Three) Times a Day for 180 days., Disp: 90 capsule, Rfl: 5    Review of Systems   All other systems reviewed and are negative.         I have reviewed ROS completed by medical assistant.     Objective:      Neurological Exam  Mental Status  Awake and alert. Oriented only to person, place, time and situation. Speech is normal.  Language is fluent with no aphasia. Attention and concentration are normal. Fund of knowledge is appropriate for level of education.    Cranial Nerves  CN II: Right visual acuity: Normal. Left visual acuity: Normal. Right normal visual field. Left normal visual field.  CN III, IV, VI: Extraocular movements intact bilaterally. No nystagmus. Normal saccades. Normal smooth pursuit.  CN VII:  Right: There is no facial weakness.  Left: There is no facial weakness.    Motor  Normal muscle bulk throughout. No fasciculations present.    Sensory  Light touch is normal in upper and lower extremities.     Coordination  Right: Finger-to-nose normal.    Gait  Casual gait is normal including stance, stride, and arm swing.  Normal gait, slight decreased arm swing.         Assessment/Plan:      Diagnoses and all orders for this visit:    1. Parkinson's disease (Primary)  -     carbidopa-levodopa (SINEMET)  MG per tablet; Take 1 tablet by mouth 4 (Four) Times a Day.  Dispense: 180 tablet; Refill: 3  -     carbidopa-levodopa CR (SINEMET CR)  MG per CR tablet; Take 1 tablet by mouth 4 (Four) Times a Day.  Dispense: 360 tablet; Refill: 3    2. Chronic midline back pain, unspecified back location  -     Discontinue: gabapentin (NEURONTIN) 300 MG capsule; Take 1 capsule by mouth 3 (Three) Times a Day for 180 days. For back pain  Dispense: 90 capsule; Refill: 5  -     Discontinue: gabapentin (NEURONTIN) 300 MG capsule; Take 1 capsule by mouth 3 (Three) Times a Day for 180 days. For back pain  Dispense: 90 capsule; Refill: 5  -     Discontinue: gabapentin (NEURONTIN) 300 MG capsule; Take 1 capsule by mouth 3 (Three) Times a Day for 180 days. For back pain  Dispense: 90 capsule; Refill: 5  -     gabapentin (NEURONTIN) 300 MG capsule; Take 1 capsule by mouth 3 (Three) Times a Day for 180 days.  Dispense: 90 capsule; Refill: 5          Return in about 1 year (around 2/25/2026), or Radha Page.     I spent 32 minutes caring  darrell Avendano on this date of service. This time includes time spent by me in the following activities: preparing for the visit, obtaining and/or reviewing a separately obtained history, performing a medically appropriate examination and/or evaluation, counseling and educating the patient/family/caregiver, ordering medications, tests, or procedures, and documenting information in the medical record.      This document has been electronically signed by DIMAS Aleman on February 25, 2025 15:13 EST

## 2025-02-25 ENCOUNTER — OFFICE VISIT (OUTPATIENT)
Dept: NEUROLOGY | Facility: CLINIC | Age: 75
End: 2025-02-25
Payer: MEDICARE

## 2025-02-25 VITALS — BODY MASS INDEX: 33.6 KG/M2 | WEIGHT: 240 LBS | HEIGHT: 71 IN

## 2025-02-25 DIAGNOSIS — G89.29 CHRONIC MIDLINE BACK PAIN, UNSPECIFIED BACK LOCATION: ICD-10-CM

## 2025-02-25 DIAGNOSIS — M54.9 CHRONIC MIDLINE BACK PAIN, UNSPECIFIED BACK LOCATION: ICD-10-CM

## 2025-02-25 DIAGNOSIS — G20.A1 PARKINSON'S DISEASE: Primary | ICD-10-CM

## 2025-02-25 PROCEDURE — 1160F RVW MEDS BY RX/DR IN RCRD: CPT | Performed by: NURSE PRACTITIONER

## 2025-02-25 PROCEDURE — 1159F MED LIST DOCD IN RCRD: CPT | Performed by: NURSE PRACTITIONER

## 2025-02-25 PROCEDURE — 99214 OFFICE O/P EST MOD 30 MIN: CPT | Performed by: NURSE PRACTITIONER

## 2025-02-25 RX ORDER — GABAPENTIN 300 MG/1
300 CAPSULE ORAL 3 TIMES DAILY
Qty: 90 CAPSULE | Refills: 5 | Status: SHIPPED | OUTPATIENT
Start: 2025-02-25 | End: 2025-02-25

## 2025-02-25 RX ORDER — CARBIDOPA AND LEVODOPA 50; 200 MG/1; MG/1
1 TABLET, EXTENDED RELEASE ORAL 4 TIMES DAILY
Qty: 360 TABLET | Refills: 3 | Status: SHIPPED | OUTPATIENT
Start: 2025-02-25

## 2025-02-25 RX ORDER — CARBIDOPA AND LEVODOPA 25; 100 MG/1; MG/1
1 TABLET ORAL 4 TIMES DAILY
Qty: 180 TABLET | Refills: 3 | Status: SHIPPED | OUTPATIENT
Start: 2025-02-25

## 2025-02-25 RX ORDER — GABAPENTIN 300 MG/1
300 CAPSULE ORAL 3 TIMES DAILY
Qty: 90 CAPSULE | Refills: 5 | Status: SHIPPED | OUTPATIENT
Start: 2025-02-25 | End: 2025-08-24